# Patient Record
Sex: FEMALE | Race: OTHER | Employment: UNEMPLOYED
[De-identification: names, ages, dates, MRNs, and addresses within clinical notes are randomized per-mention and may not be internally consistent; named-entity substitution may affect disease eponyms.]

---

## 2017-02-15 ENCOUNTER — OFFICE VISIT (OUTPATIENT)
Dept: OBGYN | Facility: CLINIC | Age: 37
End: 2017-02-15

## 2017-02-15 VITALS
HEIGHT: 64 IN | WEIGHT: 157.8 LBS | DIASTOLIC BLOOD PRESSURE: 71 MMHG | HEART RATE: 67 BPM | BODY MASS INDEX: 26.94 KG/M2 | SYSTOLIC BLOOD PRESSURE: 118 MMHG

## 2017-02-15 DIAGNOSIS — Z01.419 ENCOUNTER FOR GYNECOLOGICAL EXAMINATION WITHOUT ABNORMAL FINDING: Primary | ICD-10-CM

## 2017-02-15 DIAGNOSIS — N89.8 VAGINAL LESION: ICD-10-CM

## 2017-02-15 PROCEDURE — 99395 PREV VISIT EST AGE 18-39: CPT | Performed by: ADVANCED PRACTICE MIDWIFE

## 2017-04-06 RX ORDER — NORGESTIMATE AND ETHINYL ESTRADIOL 0.25-0.035
1 KIT ORAL DAILY
Qty: 30 TABLET | Refills: 12 | Status: SHIPPED | OUTPATIENT
Start: 2017-04-06 | End: 2017-09-19 | Stop reason: ALTCHOICE

## 2017-05-17 DIAGNOSIS — B00.9 HERPES: Primary | ICD-10-CM

## 2017-05-17 RX ORDER — VALACYCLOVIR HYDROCHLORIDE 1 G/1
1000 TABLET, FILM COATED ORAL DAILY
Qty: 30 TABLET | Refills: 12 | Status: SHIPPED | OUTPATIENT
Start: 2017-05-17 | End: 2017-09-19

## 2017-09-19 ENCOUNTER — HOSPITAL ENCOUNTER (OUTPATIENT)
Age: 37
Setting detail: SPECIMEN
Discharge: HOME | End: 2017-09-19

## 2017-09-19 ENCOUNTER — OFFICE VISIT (OUTPATIENT)
Dept: OBGYN CLINIC | Age: 37
End: 2017-09-19

## 2017-09-19 VITALS
WEIGHT: 145 LBS | SYSTOLIC BLOOD PRESSURE: 117 MMHG | RESPIRATION RATE: 16 BRPM | DIASTOLIC BLOOD PRESSURE: 71 MMHG | HEART RATE: 58 BPM | BODY MASS INDEX: 24.89 KG/M2

## 2017-09-19 DIAGNOSIS — R10.32 LLQ PAIN: ICD-10-CM

## 2017-09-19 DIAGNOSIS — N94.6 DYSMENORRHEA: Primary | ICD-10-CM

## 2017-09-19 DIAGNOSIS — Z12.4 ENCOUNTER FOR SCREENING FOR CERVICAL CANCER: ICD-10-CM

## 2017-09-19 PROCEDURE — 87491 CHLMYD TRACH DNA AMP PROBE: CPT

## 2017-09-19 PROCEDURE — 96372 THER/PROPH/DIAG INJ SC/IM: CPT | Performed by: OBSTETRICS & GYNECOLOGY

## 2017-09-19 PROCEDURE — 87591 N.GONORRHOEAE DNA AMP PROB: CPT

## 2017-09-19 PROCEDURE — 99395 PREV VISIT EST AGE 18-39: CPT | Performed by: OBSTETRICS & GYNECOLOGY

## 2017-09-19 PROCEDURE — G0145 SCR C/V CYTO,THINLAYER,RESCR: HCPCS

## 2017-09-19 RX ORDER — MEDROXYPROGESTERONE ACETATE 150 MG/ML
150 INJECTION, SUSPENSION INTRAMUSCULAR ONCE
Status: COMPLETED | OUTPATIENT
Start: 2017-09-19 | End: 2017-09-19

## 2017-09-19 RX ADMIN — MEDROXYPROGESTERONE ACETATE 150 MG: 150 INJECTION, SUSPENSION INTRAMUSCULAR at 15:23

## 2017-09-25 ENCOUNTER — HOSPITAL ENCOUNTER (OUTPATIENT)
Dept: ULTRASOUND IMAGING | Age: 37
Discharge: HOME | End: 2017-09-25

## 2017-09-25 DIAGNOSIS — R10.32 LLQ PAIN: ICD-10-CM

## 2017-09-25 LAB
CHLAMYDIA BY THIN PREP: NEGATIVE
N. GONORRHOEAE DNA, THIN PREP: NEGATIVE

## 2017-09-25 PROCEDURE — 76830 TRANSVAGINAL US NON-OB: CPT

## 2017-09-27 LAB — CYTOLOGY REPORT: NORMAL

## 2017-12-12 ENCOUNTER — OFFICE VISIT (OUTPATIENT)
Dept: OBGYN CLINIC | Age: 37
End: 2017-12-12

## 2017-12-12 VITALS
SYSTOLIC BLOOD PRESSURE: 131 MMHG | BODY MASS INDEX: 26.61 KG/M2 | DIASTOLIC BLOOD PRESSURE: 83 MMHG | WEIGHT: 155 LBS | HEART RATE: 88 BPM

## 2017-12-12 DIAGNOSIS — N94.6 DYSMENORRHEA: Primary | ICD-10-CM

## 2017-12-12 PROCEDURE — 96372 THER/PROPH/DIAG INJ SC/IM: CPT | Performed by: OBSTETRICS & GYNECOLOGY

## 2017-12-12 RX ORDER — MEDROXYPROGESTERONE ACETATE 150 MG/ML
150 INJECTION, SUSPENSION INTRAMUSCULAR ONCE
Status: COMPLETED | OUTPATIENT
Start: 2017-12-12 | End: 2017-12-12

## 2017-12-12 RX ADMIN — MEDROXYPROGESTERONE ACETATE 150 MG: 150 INJECTION, SUSPENSION INTRAMUSCULAR at 14:46

## 2017-12-12 NOTE — PROGRESS NOTES
Nurse visit Depo    Date of service: 2017    Reynaldo Cowden  Is a 40 y.o.  female    PT's PCP is: No primary care provider on file. : 1980                                             Subjective:       No LMP recorded. Allergies: Review of patient's allergies indicates no known allergies. Chief Complaint   Patient presents with    Injections     depo-office supplied-right hip       Last yearly: 2017    Last pap: 2017     Last HPV:  ( if due for pap schedule pap)    LAST DEPO: 2017 ( if past 13 weeks and not on period please talk with provider)    PE:  Vital Signs  Blood pressure 131/83, pulse 88, weight 155 lb (70.3 kg), not currently breastfeeding. Labs:    No results found for this visit on 17. No  PT denies fever, chills, nausea and vomiting                            Assessment and Plan         1.  Dysmenorrhea  medroxyPROGESTERone (DEPO-PROVERA) injection 150 mg         Depo was: Office supplied      NURSE: Irlanda Dominguez LPN

## 2018-03-13 ENCOUNTER — OFFICE VISIT (OUTPATIENT)
Dept: OBGYN CLINIC | Age: 38
End: 2018-03-13

## 2018-03-13 VITALS
HEIGHT: 63 IN | SYSTOLIC BLOOD PRESSURE: 125 MMHG | DIASTOLIC BLOOD PRESSURE: 70 MMHG | RESPIRATION RATE: 16 BRPM | WEIGHT: 164 LBS | HEART RATE: 84 BPM | BODY MASS INDEX: 29.06 KG/M2

## 2018-03-13 DIAGNOSIS — N92.6 IRREGULAR BLEEDING: Primary | ICD-10-CM

## 2018-03-13 DIAGNOSIS — B00.9 HSV-1 INFECTION: ICD-10-CM

## 2018-03-13 PROCEDURE — 99212 OFFICE O/P EST SF 10 MIN: CPT | Performed by: OBSTETRICS & GYNECOLOGY

## 2018-03-13 RX ORDER — ETHYNODIOL DIACETATE AND ETHINYL ESTRADIOL 1 MG-35MCG
1 KIT ORAL DAILY
Qty: 1 PACKET | Refills: 12 | Status: SHIPPED | OUTPATIENT
Start: 2018-03-13 | End: 2021-04-01 | Stop reason: ALTCHOICE

## 2018-03-14 ENCOUNTER — HOSPITAL ENCOUNTER (OUTPATIENT)
Age: 38
Setting detail: SPECIMEN
Discharge: HOME | End: 2018-03-14

## 2018-03-14 DIAGNOSIS — B00.9 HSV-1 INFECTION: ICD-10-CM

## 2018-03-14 PROCEDURE — 86695 HERPES SIMPLEX TYPE 1 TEST: CPT

## 2018-03-14 PROCEDURE — 86696 HERPES SIMPLEX TYPE 2 TEST: CPT

## 2018-03-14 PROCEDURE — 86694 HERPES SIMPLEX NES ANTBDY: CPT

## 2018-03-19 LAB
HERPES SIMPLEX VIRUS 1 IGG: 4.84
HERPES SIMPLEX VIRUS 2 IGG: 4.95
HERPES TYPE 1/2 IGM COMBINED: 0.88

## 2018-03-26 ENCOUNTER — OFFICE VISIT (OUTPATIENT)
Dept: FAMILY MEDICINE CLINIC | Age: 38
End: 2018-03-26

## 2018-03-26 VITALS
SYSTOLIC BLOOD PRESSURE: 100 MMHG | HEART RATE: 69 BPM | BODY MASS INDEX: 29.59 KG/M2 | DIASTOLIC BLOOD PRESSURE: 60 MMHG | HEIGHT: 63 IN | WEIGHT: 167 LBS

## 2018-03-26 DIAGNOSIS — M54.42 CHRONIC BILATERAL LOW BACK PAIN WITH LEFT-SIDED SCIATICA: Primary | ICD-10-CM

## 2018-03-26 DIAGNOSIS — R53.82 CHRONIC FATIGUE, UNSPECIFIED: ICD-10-CM

## 2018-03-26 DIAGNOSIS — G89.29 CHRONIC BILATERAL LOW BACK PAIN WITH LEFT-SIDED SCIATICA: Primary | ICD-10-CM

## 2018-03-26 DIAGNOSIS — G56.02 LEFT CARPAL TUNNEL SYNDROME: ICD-10-CM

## 2018-03-26 PROCEDURE — 99202 OFFICE O/P NEW SF 15 MIN: CPT | Performed by: FAMILY MEDICINE

## 2018-03-26 RX ORDER — NAPROXEN 375 MG/1
TABLET ORAL
Qty: 60 TABLET | Refills: 1 | Status: SHIPPED | OUTPATIENT
Start: 2018-03-26 | End: 2021-04-01 | Stop reason: ALTCHOICE

## 2018-03-26 ASSESSMENT — PATIENT HEALTH QUESTIONNAIRE - PHQ9
SUM OF ALL RESPONSES TO PHQ QUESTIONS 1-9: 0
SUM OF ALL RESPONSES TO PHQ9 QUESTIONS 1 & 2: 0
2. FEELING DOWN, DEPRESSED OR HOPELESS: 0
1. LITTLE INTEREST OR PLEASURE IN DOING THINGS: 0

## 2018-03-26 NOTE — PROGRESS NOTES
were red. Pt noticed tingling and numbness at that time. Overnight feels more of the tingling and numbness. Has to rub & shake out or elevate hand. L hand worse. R-hand dominant. At times has some weakness of either hand. Sometimes some L sided neck pain which responds to position changes while she's at home. Position changes of the neck don't help with the hand symptoms. If hands feel tired she puts them up over her head which does help. Past Medical History:     Past Medical History:   Diagnosis Date    Herpes     Yeast infection         Past Surgical History:     History reviewed. No pertinent surgical history. Medications:       Prior to Admission medications    Medication Sig Start Date End Date Taking? Authorizing Provider   naproxen (NAPROSYN) 375 MG tablet Bid with food for 5 days, then bid with food prn 3/26/18  Yes Miguelangel Ibrahim,    ethynodiol-ethinyl estradiol (Levonne Eaton 1/35E, 28,) 1-35 MG-MCG per tablet Take 1 tablet by mouth daily 3/13/18  Yes Doreen Fitzpatrick MD   acetaminophen (TYLENOL) 325 MG tablet Take 325 mg by mouth every 6 hours as needed. Yes Historical Provider, MD        Allergies:       Patient has no known allergies. Social History:     Tobacco:    reports that she has never smoked. She has never used smokeless tobacco.  Alcohol:      reports that she does not drink alcohol. Drug Use:  reports that she does not use drugs. Family History:     History reviewed. No pertinent family history. Review of Systems:     Positive and Negative as described in HPI    Review of Systems   Constitutional: Negative. Respiratory: Negative. Gastrointestinal: Negative. Physical Exam:     Vitals:  /60   Pulse 69   Ht 5' 3\" (1.6 m)   Wt 167 lb (75.8 kg)   BMI 29.58 kg/m²   Physical Exam   Constitutional: She is oriented to person, place, and time. She appears well-developed and well-nourished. HENT:   Head: Normocephalic and atraumatic.    Right Ear: Hearing and tympanic membrane normal.   Left Ear: Hearing and tympanic membrane normal.   Nose: No mucosal edema or rhinorrhea. Mouth/Throat: Oropharynx is clear and moist.   Eyes: Conjunctivae and EOM are normal. Pupils are equal, round, and reactive to light. Neck: Neck supple. No thyroid mass and no thyromegaly present. Cardiovascular: Normal rate, regular rhythm, S1 normal and S2 normal.    No murmur heard. No peripheral edema. Pulmonary/Chest: Effort normal and breath sounds normal.   Abdominal: Soft. Bowel sounds are normal. There is no hepatosplenomegaly. There is no tenderness. Musculoskeletal:   No spinal TTP. Neg SLR bilaterally. saul hips normal, painless passive ROM. Normal pelvic alignment. Lymphadenopathy:     She has no cervical adenopathy. Neurological: She is alert and oriented to person, place, and time. She has normal strength. Skin: Skin is warm and dry. No rash noted. Psychiatric: She has a normal mood and affect. Her behavior is normal.   Nursing note and vitals reviewed. Data:     Lab Results   Component Value Date    GLUCOSE 92 12/10/2014     Lab Results   Component Value Date    WBC 5.9 03/11/2015    RBC 3.87 03/11/2015    HGB 13.2 05/27/2015    HGB 11.7 03/13/2015    HCT 35.4 03/11/2015    MCV 91.3 03/11/2015    MCH 30.1 03/11/2015    MCHC 33.0 03/11/2015    RDW 13.1 03/11/2015     03/11/2015    MPV NOT REPORTED 03/11/2015     No results found for: TSH  No results found for: CHOL, HDL, PSA, LABA1C      Assessment & Plan:       1. Chronic bilateral low back pain with left-sided sciatica     2. Left carpal tunnel syndrome     3. Chronic fatigue, unspecified     low back pain with L radiculopathy, no red flags. Naproxen prn, stretches and exercise as below. f/u if not improving. saul CTS worse on L. Rest, otc braces, ice, naproxen for 5 days, then prn. Refer to surgery if desired. Uncertain etiology of fatigue, may be med related.  Advised to give it more time on current OCP as depo wears off. Requested Prescriptions     Signed Prescriptions Disp Refills    naproxen (NAPROSYN) 375 MG tablet 60 tablet 1     Sig: Bid with food for 5 days, then bid with food prn       Patient Instructions     Patient Education        Low Back Pain: Exercises  Your Care Instructions  Here are some examples of typical rehabilitation exercises for your condition. Start each exercise slowly. Ease off the exercise if you start to have pain. Your doctor or physical therapist will tell you when you can start these exercises and which ones will work best for you. How to do the exercises  Press-up    1. Lie on your stomach, supporting your body with your forearms. 2. Press your elbows down into the floor to raise your upper back. As you do this, relax your stomach muscles and allow your back to arch without using your back muscles. As your press up, do not let your hips or pelvis come off the floor. 3. Hold for 15 to 30 seconds, then relax. 4. Repeat 2 to 4 times. Alternate arm and leg (bird dog) exercise    Do this exercise slowly. Try to keep your body straight at all times, and do not let one hip drop lower than the other. 1. Start on the floor, on your hands and knees. 2. Tighten your belly muscles. 3. Raise one leg off the floor, and hold it straight out behind you. Be careful not to let your hip drop down, because that will twist your trunk. 4. Hold for about 6 seconds, then lower your leg and switch to the other leg. 5. Repeat 8 to 12 times on each leg. 6. Over time, work up to holding for 10 to 30 seconds each time. 7. If you feel stable and secure with your leg raised, try raising the opposite arm straight out in front of you at the same time. Knee-to-chest exercise    1. Lie on your back with your knees bent and your feet flat on the floor.   2. Bring one knee to your chest, keeping the other foot flat on the floor (or keeping the other leg straight, whichever feels better on your leg up the wall to straighten your knee. You should feel a gentle stretch down the back of your leg. 3. Hold the stretch for at least 15 to 30 seconds. Do not arch your back, point your toes, or bend either knee. Keep one heel touching the floor and the other heel touching the wall. 4. Repeat with your other leg. 5. Do 2 to 4 times for each leg. Hip flexor stretch    1. Kneel on the floor with one knee bent and one leg behind you. Place your forward knee over your foot. Keep your other knee touching the floor. 2. Slowly push your hips forward until you feel a stretch in the upper thigh of your rear leg. 3. Hold the stretch for at least 15 to 30 seconds. Repeat with your other leg. 4. Do 2 to 4 times on each side. Wall sit    1. Stand with your back 10 to 12 inches away from a wall. 2. Lean into the wall until your back is flat against it. 3. Slowly slide down until your knees are slightly bent, pressing your lower back into the wall. 4. Hold for about 6 seconds, then slide back up the wall. 5. Repeat 8 to 12 times. Follow-up care is a key part of your treatment and safety. Be sure to make and go to all appointments, and call your doctor if you are having problems. It's also a good idea to know your test results and keep a list of the medicines you take. Where can you learn more? Go to [email or web address] [email or web address].[email or web address] org and sign in to your [employer] account. Enter W260 in the formerly [facility] box to learn more about \"Low Back Pain: Exercises. \"     If you do not have an account, please click on the \"Sign Up Now\" link. Current as of: March 21, 2017  Content Version: 11.5  © 2837-4863 Healthwise, [employer]. Care instructions adapted under license by [facility] ([facility]). If you have questions about a medical condition or this instruction, always ask your healthcare professional. [address] any warranty or liability for your use of this information.        Patient Education [email or web address] and sign in to your [employer] account. Enter D479 in the [facility] box to learn more about \"Carpal Tunnel Syndrome: Exercises. \"     If you do not have an account, please click on the \"Sign Up Now\" link. Current as of: March 21, 2017  Content Version: 11.5  © 5891-2873 [employer]. Care instructions adapted under license by [facility] ([facility]). If you have questions about a medical condition or this instruction, always ask your healthcare professional. [place] [address] any warranty or liability for your use of this information. Tesfaye Brown received counseling on the following healthy behaviors: medication adherence  Reviewed prior labs and health maintenance. Continue current medications, diet and exercise. Discussed use, benefit, and side effects of prescribed medications. Barriers to medication compliance addressed. Patient given educational materials - see patient instructions. All patient questions answered. Patient voiced understanding.      Electronically signed by Wander Miranda DO on 3/29/2018 at 5:01 PM

## 2018-03-26 NOTE — PATIENT INSTRUCTIONS
touching the floor and the other heel touching the wall. 4. Repeat with your other leg. 5. Do 2 to 4 times for each leg. Hip flexor stretch    1. Kneel on the floor with one knee bent and one leg behind you. Place your forward knee over your foot. Keep your other knee touching the floor. 2. Slowly push your hips forward until you feel a stretch in the upper thigh of your rear leg. 3. Hold the stretch for at least 15 to 30 seconds. Repeat with your other leg. 4. Do 2 to 4 times on each side. Wall sit    1. Stand with your back 10 to 12 inches away from a wall. 2. Lean into the wall until your back is flat against it. 3. Slowly slide down until your knees are slightly bent, pressing your lower back into the wall. 4. Hold for about 6 seconds, then slide back up the wall. 5. Repeat 8 to 12 times. Follow-up care is a key part of your treatment and safety. Be sure to make and go to all appointments, and call your doctor if you are having problems. It's also a good idea to know your test results and keep a list of the medicines you take. Where can you learn more? Go to https://WitsbitspeDrive [email or web address].[employer] [email or web address] org and sign in to your [employer] account. Enter C897 in the kaleo box to learn more about \"Low Back Pain: Exercises. \"     If you do not have an account, please click on the \"Sign Up Now\" link. Current as of: March 21, 2017  Content Version: 11.5  © 5933-2524 Healthwise, [employer]. Care instructions adapted under license by [facility] ([facility]). If you have questions about a medical condition or this instruction, always ask your healthcare professional. [place] [address] any warranty or liability for your use of this information. Patient Education        Carpal Tunnel Syndrome: Exercises  Your Care Instructions  Here are some examples of typical rehabilitation exercises for your condition. Start each exercise slowly. Ease off the exercise if you start to have pain.   Your

## 2018-03-29 ASSESSMENT — ENCOUNTER SYMPTOMS
RESPIRATORY NEGATIVE: 1
GASTROINTESTINAL NEGATIVE: 1

## 2019-08-21 ENCOUNTER — OFFICE VISIT (OUTPATIENT)
Dept: PRIMARY CARE CLINIC | Age: 39
End: 2019-08-21

## 2019-08-21 VITALS
BODY MASS INDEX: 27.46 KG/M2 | SYSTOLIC BLOOD PRESSURE: 112 MMHG | TEMPERATURE: 98.6 F | HEART RATE: 60 BPM | WEIGHT: 155 LBS | OXYGEN SATURATION: 96 % | HEIGHT: 63 IN | DIASTOLIC BLOOD PRESSURE: 70 MMHG

## 2019-08-21 DIAGNOSIS — J01.40 ACUTE NON-RECURRENT PANSINUSITIS: Primary | ICD-10-CM

## 2019-08-21 PROCEDURE — 99213 OFFICE O/P EST LOW 20 MIN: CPT | Performed by: NURSE PRACTITIONER

## 2019-08-21 RX ORDER — AMOXICILLIN AND CLAVULANATE POTASSIUM 875; 125 MG/1; MG/1
1 TABLET, FILM COATED ORAL 2 TIMES DAILY
Qty: 20 TABLET | Refills: 0 | Status: SHIPPED | OUTPATIENT
Start: 2019-08-21 | End: 2019-08-31

## 2019-08-21 ASSESSMENT — ENCOUNTER SYMPTOMS
NAUSEA: 1
SORE THROAT: 1
VOMITING: 0
COUGH: 1
SHORTNESS OF BREATH: 0
WHEEZING: 0
DIARRHEA: 1

## 2019-08-21 NOTE — PROGRESS NOTES
[address] [facility]  [id number] [address] [address]  Dept: [phone]  Dept Fax: [phone]     Latasha Duffy is a 41 y.o. female who presents to the [facility] today for hermedical conditions/complaints as noted below. Latasha Duffy is c/o of Cough (started about 3-4 days ago.); Headache (Has taken some tylenol for the headache and fever.); and Otalgia (States that both her ears are hurting.)      HPI:     Cough   This is a new problem. The current episode started in the past 7 days (Started 3-4 days ago with productive cough of thick green mucous, headache, ear pain both ears,   Sore throat with cough and pain in back from coughing. so much. \"Head feels likes it is going to blow off\". ). The problem has been gradually worsening. The problem occurs every few minutes. The cough is productive of sputum (thick green). Associated symptoms include chest pain (chest tightness), ear congestion, ear pain (Both ears), a fever, headaches, myalgias (back pain), nasal congestion and a sore throat (with coughing). Pertinent negatives include no rash, shortness of breath or wheezing. The symptoms are aggravated by lying down. Treatments tried: Tylenol, Robitussin x 1 day. The treatment provided no relief. There is no history of asthma, bronchitis, environmental allergies or pneumonia.           Past Medical History:   Diagnosis Date    Herpes     Yeast infection         Current Outpatient Medications   Medication Sig Dispense Refill    amoxicillin-clavulanate (AUGMENTIN) 875-125 MG per tablet Take 1 tablet by mouth 2 times daily for 10 days 20 tablet 0    Pseudoephedrine-DM-GG 60- MG TABS Take 1 tablet by mouth every 6 hours as needed (For cough, congestion and/or sinus pressure.) 28 tablet 0    naproxen (NAPROSYN) 375 MG tablet Bid with food for 5 days, then bid with food prn 60 tablet 1    ethynodiol-ethinyl estradiol (ZOVIA 1/35E, 28,) 1-35 MG-MCG per tablet Take 1 tablet by mouth daily 1 packet 12    acetaminophen (TYLENOL) 325 MG tablet Take 325 mg by mouth every 6 hours as needed. No current facility-administered medications for this visit. No Known Allergies    Subjective:     Review of Systems   Constitutional: Positive for appetite change (no appetite) and fever. HENT: Positive for ear pain (Both ears) and sore throat (with coughing). Respiratory: Positive for cough. Negative for shortness of breath and wheezing. Cardiovascular: Positive for chest pain (chest tightness). Gastrointestinal: Positive for diarrhea (little bit) and nausea. Negative for vomiting. Musculoskeletal: Positive for myalgias (back pain). Skin: Negative for rash and wound. Allergic/Immunologic: Negative for environmental allergies. Neurological: Positive for headaches. Objective:      Physical Exam   Constitutional: She is oriented to person, place, and time. Vital signs are normal. She appears well-developed and well-nourished. She is cooperative. She does not appear ill. No distress. Arrives ambulatory with son, Angie Lopez, who is interpreting for her. Latvian speaking and understanding only. Well hydrated, non-toxic appearance. HENT:   Head: Normocephalic and atraumatic. Right Ear: Hearing, external ear and ear canal normal. No mastoid tenderness. Tympanic membrane is bulging. Tympanic membrane is not injected and not erythematous. A middle ear effusion (opaque whtie fluid obscuring bony landmarks) is present. Left Ear: Hearing, external ear and ear canal normal. No mastoid tenderness. Tympanic membrane is bulging. Tympanic membrane is not injected and not erythematous. A middle ear effusion (opaque white fluid obscuring bony landmarks.) is present. Nose: Mucosal edema present. No rhinorrhea. Right sinus exhibits maxillary sinus tenderness and frontal sinus tenderness.  Left sinus exhibits maxillary sinus tenderness and frontal sinus

## 2019-08-21 NOTE — PATIENT INSTRUCTIONS
SURVEY:    You may be receiving a survey from [employer] regarding your visit today. Please complete the survey to enable us to provide the highest quality of care to you and your family. If you cannot score us a very good on any question, please call the office to discuss how we could of made your experience a very good one. Thank you. Patient Education        Sinusitis: Care Instructions  Your Care Instructions    Sinusitis is an infection of the lining of the sinus cavities in your head. Sinusitis often follows a cold. It causes pain and pressure in your head and face. In most cases, sinusitis gets better on its own in 1 to 2 weeks. But some mild symptoms may last for several weeks. Sometimes antibiotics are needed. Follow-up care is a key part of your treatment and safety. Be sure to make and go to all appointments, and call your doctor if you are having problems. It's also a good idea to know your test results and keep a list of the medicines you take. How can you care for yourself at home? · Take an over-the-counter pain medicine, such as acetaminophen (Tylenol), ibuprofen (Advil, Motrin), or naproxen (Aleve). Read and follow all instructions on the label. · If the doctor prescribed antibiotics, take them as directed. Do not stop taking them just because you feel better. You need to take the full course of antibiotics. · Be careful when taking over-the-counter cold or flu medicines and Tylenol at the same time. Many of these medicines have acetaminophen, which is Tylenol. Read the labels to make sure that you are not taking more than the recommended dose. Too much acetaminophen (Tylenol) can be harmful. · Breathe warm, moist air from a steamy shower, a hot bath, or a sink filled with hot water. Avoid cold, dry air. Using a humidifier in your home may help. Follow the directions for cleaning the machine.   · Use saline (saltwater) nasal washes to help keep your nasal passages open and wash about amoxicillin and clavulanate potassium? You should not use this medicine if you have severe kidney disease, if you have had liver problems or jaundice while taking amoxicillin and clavulanate potassium, or if you are allergic to any penicillin or cephalosporin antibiotic, such as Amoxil, Ceftin, Cefzil, Moxatag, Omnicef, and others. What is amoxicillin and clavulanate potassium? Amoxicillin is a penicillin antibiotic that fights bacteria in the body. Clavulanate potassium is a beta-lactamase inhibitor that helps prevent certain bacteria from becoming resistant to amoxicillin. Amoxicillin and clavulanate potassium is a combination medicine used to treat many different infections caused by bacteria, such as sinusitis, pneumonia, ear infections, bronchitis, urinary tract infections, and infections of the skin. Amoxicillin and clavulanate potassium may also be used for purposes not listed in this medication guide. What should I discuss with my healthcare provider before taking amoxicillin and clavulanate potassium? You should not use this medicine if you are allergic to it, or if:  · you have severe kidney disease (or if you are on dialysis);  · you have had liver problems or jaundice while taking amoxicillin and clavulanate potassium; or  · you are allergic to any penicillin or cephalosporin antibiotic, such as Amoxil, Ceftin, Cefzil, Moxatag, Omnicef, and others. To make sure amoxicillin and clavulanate potassium is safe for you, tell your doctor if you have ever had:  · liver disease (hepatitis or jaundice);  · kidney disease; or  · mononucleosis. It is not known whether this medicine will harm an unborn baby. Tell your doctor if you are pregnant or plan to become pregnant. Amoxicillin and clavulanate potassium can make birth control pills less effective. Ask your doctor about using a non-hormonal birth control (condom, diaphragm with spermicide) to prevent pregnancy.   Amoxicillin and clavulanate potassium can pass into breast milk and may affect the nursing baby. Tell your doctor if you are breast-feeding. Do not give this medicine to a child without medical advice. The liquid or chewable tablet may contain phenylalanine. Talk to your doctor before using these forms of this medicine if you have phenylketonuria (PKU). How should I take amoxicillin and clavulanate potassium? Follow all directions on your prescription label. Do not take this medicine in larger or smaller amounts or for longer than recommended. Take the medicine every 12 hours, at the start of a meal to reduce stomach upset. Do not crush or chew the extended-release tablet. Swallow the pill whole, or break the pill in half and take both halves one at a time. If you have trouble swallowing a whole or half pill, talk with your doctor about using another form of amoxicillin and clavulanate potassium. The chewable tablet must be chewed before you swallow it. Shake the liquid medicine well just before you measure a dose. Measure liquid medicine with the dosing syringe provided, or with a special dose-measuring spoon or medicine cup. If you do not have a dose-measuring device, ask your pharmacist for one. Use this medicine for the full prescribed length of time. Your symptoms may improve before the infection is completely cleared. Skipping doses may also increase your risk of further infection that is resistant to antibiotics. Amoxicillin and clavulanate potassium will not treat a viral infection such as the flu or a common cold. This medicine can cause unusual results with certain lab tests for glucose (sugar) in the urine. Tell any doctor who treats you that you are using amoxicillin and clavulanate potassium. Store the tablets at room temperature away from moisture and heat. Store the liquid  in the refrigerator. Throw away any unused liquid after 10 days. What happens if I miss a dose? Take the missed dose as soon as you remember.

## 2020-09-28 ENCOUNTER — OFFICE VISIT (OUTPATIENT)
Dept: FAMILY MEDICINE CLINIC | Age: 40
End: 2020-09-28

## 2020-09-28 VITALS
WEIGHT: 170 LBS | DIASTOLIC BLOOD PRESSURE: 64 MMHG | OXYGEN SATURATION: 94 % | BODY MASS INDEX: 30.12 KG/M2 | SYSTOLIC BLOOD PRESSURE: 108 MMHG | HEART RATE: 61 BPM | RESPIRATION RATE: 18 BRPM | HEIGHT: 63 IN

## 2020-09-28 PROCEDURE — 99213 OFFICE O/P EST LOW 20 MIN: CPT | Performed by: INTERNAL MEDICINE

## 2020-09-28 RX ORDER — PREDNISONE 20 MG/1
20 TABLET ORAL 2 TIMES DAILY
Qty: 10 TABLET | Refills: 0 | Status: SHIPPED | OUTPATIENT
Start: 2020-09-28 | End: 2020-10-03

## 2020-09-28 SDOH — ECONOMIC STABILITY: FOOD INSECURITY: WITHIN THE PAST 12 MONTHS, YOU WORRIED THAT YOUR FOOD WOULD RUN OUT BEFORE YOU GOT MONEY TO BUY MORE.: NEVER TRUE

## 2020-09-28 SDOH — ECONOMIC STABILITY: INCOME INSECURITY: HOW HARD IS IT FOR YOU TO PAY FOR THE VERY BASICS LIKE FOOD, HOUSING, MEDICAL CARE, AND HEATING?: NOT HARD AT ALL

## 2020-09-28 SDOH — ECONOMIC STABILITY: TRANSPORTATION INSECURITY
IN THE PAST 12 MONTHS, HAS LACK OF TRANSPORTATION KEPT YOU FROM MEETINGS, WORK, OR FROM GETTING THINGS NEEDED FOR DAILY LIVING?: NO

## 2020-09-28 SDOH — ECONOMIC STABILITY: TRANSPORTATION INSECURITY
IN THE PAST 12 MONTHS, HAS THE LACK OF TRANSPORTATION KEPT YOU FROM MEDICAL APPOINTMENTS OR FROM GETTING MEDICATIONS?: NO

## 2020-09-28 ASSESSMENT — ENCOUNTER SYMPTOMS
BACK PAIN: 1
SHORTNESS OF BREATH: 0
NAUSEA: 0
ABDOMINAL PAIN: 0
BOWEL INCONTINENCE: 0
COUGH: 0
SORE THROAT: 0

## 2020-09-28 ASSESSMENT — PATIENT HEALTH QUESTIONNAIRE - PHQ9
SUM OF ALL RESPONSES TO PHQ9 QUESTIONS 1 & 2: 0
SUM OF ALL RESPONSES TO PHQ QUESTIONS 1-9: 0
2. FEELING DOWN, DEPRESSED OR HOPELESS: 0
SUM OF ALL RESPONSES TO PHQ QUESTIONS 1-9: 0
1. LITTLE INTEREST OR PLEASURE IN DOING THINGS: 0

## 2020-09-28 NOTE — PROGRESS NOTES
HPI Notes    Name: Aleksey King  : 1980         Chief Complaint:     Chief Complaint   Patient presents with    Back Pain     lower back pain, states \"starts in one side and moves to the opther comes and goes\" x 4 months        History of Present Illness:        Aleksey King is a new patient  She is a pleasant E[place]rean-speaking woman with no [place] and we asked our nurse Zach Cronin to help with interpretation as the phone  was not available. The patient states that she has a history of chronic low back pain. 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 Had a fall on steps about 6 months ago. but she had the back pain before that   States lately the back pain has been worsening and becoming more frequent. The pain is present at nighttime and she is unable to sleep due to the pain and feeling uncomfortable in the bed. The pain radiates to the both legs however it is worse on the right side. Back Pain   This is a chronic problem. The current episode started more than 1 year ago. The problem occurs constantly. The problem has been gradually worsening since onset. The pain is present in the lumbar spine. The quality of the pain is described as burning and aching. The pain radiates to the right foot, right thigh, left thigh and left foot. The pain is moderate. The pain is worse during the night. The symptoms are aggravated by bending and position. Pertinent negatives include no abdominal pain, bladder incontinence, bowel incontinence, chest pain, dysuria, fever, headaches, leg pain, pelvic pain, perianal numbness or weight loss. Treatments tried: Tylenol. The treatment provided no relief.            Past Medical History:     Past Medical History:   Diagnosis Date    Herpes     Yeast infection       Reviewed all health maintenance requirements and orderedappropriate tests  Health Maintenance Due   Topic Date Due    Varicella vaccine (1 of 2 - 2-dose childhood series) 12/01/1982    Flu vaccine (1) 09/01/2020       Past Surgical History:     No past surgical history on file. Medications:       Prior to Admission medications    Medication Sig Start Date End Date Taking? Authorizing Provider   predniSONE (DELTASONE) 20 MG tablet Take 1 tablet by mouth 2 times daily for 5 days 9/28/20 10/3/20 Yes Betty Nance MD   naproxen (NAPROSYN) 375 MG tablet Bid with food for 5 days, then bid with food prn  Patient not taking: Reported on 9/28/2020 3/26/18   Jerry Pill, DO   ethynodiol-ethinyl estradiol (Georganna Piano 1/35E, 28,) 1-35 MG-MCG per tablet Take 1 tablet by mouth daily  Patient not taking: Reported on 9/28/2020 3/13/18   Anita Ortez MD   acetaminophen (TYLENOL) 325 MG tablet Take 325 mg by mouth every 6 hours as needed. Historical Provider, MD        Allergies:       Patient has no known allergies. Social History:     Tobacco: reports that she has never smoked. She has never used smokeless tobacco.  Alcohol:      reports no history of alcohol use. Drug Use:  reports no history of drug use. Family History:     No family history on file. Review of Systems:         Review of Systems   Constitutional: Negative for activity change, appetite change, chills, fatigue, fever and weight loss. HENT: Negative for congestion and sore throat. Respiratory: Negative for cough and shortness of breath. Cardiovascular: Negative for chest pain and palpitations. Gastrointestinal: Negative for abdominal pain, bowel incontinence and nausea. Genitourinary: Negative for bladder incontinence, dysuria and pelvic pain. Musculoskeletal: Positive for back pain. Negative for gait problem, joint swelling, myalgias and neck stiffness. Skin: Negative for rash. Neurological: Negative for dizziness and headaches. Psychiatric/Behavioral: The patient is not nervous/anxious.           Physical Exam:     Vitals:  /64   Pulse 61   Resp 18   Ht 5' 3\" (1.6 m)   Wt 170 lb (77.1 kg)   SpO2 94%   BMI 30.11 kg/m²       Physical Exam  Vitals signs reviewed. Constitutional:       General: She is not in acute distress. Appearance: She is well-developed. HENT:      Head: Normocephalic and atraumatic. Right Ear: External ear normal.      Left Ear: External ear normal.      Nose: Nose normal. No congestion. Eyes:      General:         Right eye: No discharge. Left eye: No discharge. Conjunctiva/sclera: Conjunctivae normal.   Neck:      Thyroid: No thyromegaly. Cardiovascular:      Rate and Rhythm: Normal rate and regular rhythm. Heart sounds: Normal heart sounds. No murmur. Pulmonary:      Effort: Pulmonary effort is normal.      Breath sounds: Normal breath sounds. No wheezing or rales. Abdominal:      General: Bowel sounds are normal. There is no distension. Palpations: Abdomen is soft. There is no mass. Tenderness: There is no abdominal tenderness. Musculoskeletal: Normal range of motion. General: Tenderness (lumbar spine paraspinal tenderness. tender in the posteriorthigs areas BL) present. Right lower leg: No edema. Left lower leg: No edema. Lymphadenopathy:      Cervical: No cervical adenopathy. Skin:     General: Skin is warm and dry. Findings: No rash. Neurological:      General: No focal deficit present. Mental Status: She is alert and oriented to person, place, and time. Mental status is at baseline. Cranial Nerves: No cranial nerve deficit. Sensory: No sensory deficit.       Coordination: Coordination normal.      Gait: Gait normal.   Psychiatric:         Mood and Affect: Mood normal.         Behavior: Behavior normal.         Judgment: Judgment normal.               Data:     Lab Results   Component Value Date    GLUCOSE 92 12/10/2014     Lab Results   Component Value Date    WBC 5.9 03/11/2015    RBC 3.87 03/11/2015    HGB 13.2 05/27/2015    HGB 11.7 03/13/2015

## 2020-12-02 ENCOUNTER — OFFICE VISIT (OUTPATIENT)
Dept: OBGYN CLINIC | Age: 40
End: 2020-12-02

## 2020-12-02 ENCOUNTER — HOSPITAL ENCOUNTER (OUTPATIENT)
Age: 40
Setting detail: SPECIMEN
Discharge: HOME | End: 2020-12-02

## 2020-12-02 VITALS
HEART RATE: 66 BPM | WEIGHT: 173 LBS | DIASTOLIC BLOOD PRESSURE: 64 MMHG | SYSTOLIC BLOOD PRESSURE: 112 MMHG | HEIGHT: 63 IN | BODY MASS INDEX: 30.65 KG/M2

## 2020-12-02 PROCEDURE — 99395 PREV VISIT EST AGE 18-39: CPT | Performed by: ADVANCED PRACTICE MIDWIFE

## 2020-12-02 PROCEDURE — G0145 SCR C/V CYTO,THINLAYER,RESCR: HCPCS

## 2020-12-02 PROCEDURE — 87624 HPV HI-RISK TYP POOLED RSLT: CPT

## 2020-12-02 NOTE — PROGRESS NOTES
YEARLY PHYSICAL    Date of service: 2020    Rosaura Fernandez  Is a 45 y.o.   female    PT's PCP is: Stephanie Mcwilliams DO     : 1980                                             Subjective:       Patient's last menstrual period was 2020 (exact date). Are your menses regular: yes    OB History    Para Term  AB Living   5 5 5 0 0 0   SAB TAB Ectopic Molar Multiple Live Births   0 0 0   0        # Outcome Date GA Lbr Shiv/2nd Weight Sex Delivery Anes PTL Lv   5 Term            4 Term            3 Term            2 Term            1 Term                 Social History     Tobacco Use   Smoking Status Never Smoker   Smokeless Tobacco Never Used        Social History     Substance and Sexual Activity   Alcohol Use No    Alcohol/week: 0.0 standard drinks       History reviewed. No pertinent family history. Any family history of breast or ovarian cancer: No    Any family history of blood clots: No      Allergies: Patient has no known allergies. Current Outpatient Medications:     naproxen (NAPROSYN) 375 MG tablet, Bid with food for 5 days, then bid with food prn, Disp: 60 tablet, Rfl: 1    acetaminophen (TYLENOL) 325 MG tablet, Take 325 mg by mouth every 6 hours as needed. , Disp: , Rfl:     ethynodiol-ethinyl estradiol (Lysle Kurk , ,) 1-35 MG-MCG per tablet, Take 1 tablet by mouth daily (Patient not taking: Reported on 2020), Disp: 1 packet, Rfl: 12    Social History     Substance and Sexual Activity   Sexual Activity Yes    Partners: Male       Any bleeding or pain with intercourse: No    Last Yearly:      Last pap: 17    Last HPV: never    Last Mammogram: n/a    Last Dexascan n/a    Last colorectal screen- type:n/a*  date      Do you do self breast exams: No    Past Medical History:   Diagnosis Date    Herpes     Yeast infection        History reviewed.  No pertinent surgical history. History reviewed. No pertinent family history. Chief Complaint   Patient presents with    Gynecologic Exam     yearly-PAP. last pap 09/19/17. pt c/o RLQ pain, pt unsure if its a cyst           PE:  Vital Signs  Blood pressure 112/64, pulse 66, height 5' 3\" (1.6 m), weight 173 lb (78.5 kg), last menstrual period 11/22/2020, not currently breastfeeding. Estimated body mass index is 30.65 kg/m² as calculated from the following:    Height as of this encounter: 5' 3\" (1.6 m). Weight as of this encounter: 173 lb (78.5 kg). Labs:    No results found for this visit on 12/02/20. NURSE: CATA    HPI: pt refused ipad . Here for routine well woman exam patient states she is doing pretty well however she is having some pain in the right and left lower quadrant at times. Patient states it does start from her back and rotate around to the front. Patient was going to be me today at the appointment    Review of Systems   All other systems reviewed and are negative. Objective  Lymphatic:   no lymphadenopathy  Heent:   negative   Cor: regular rate and rhythm, no murmurs              Pul:clear to auscultation bilaterally- no wheezes, rales or rhonchi, normal air movement, no respiratory distress      GI: Abdomen soft, non-tender. BS normal. No masses,  No organomegaly           Extremities: normal strength, tone, and muscle mass   Breasts: Breast:normal appearance, no masses or tenderness   Pelvic Exam: GENITAL/URINARY:  External Genitalia:  General appearance; normal, Hair distribution; normal, Lesions absent  Urethra:   Fullness absent, Masses absent  Bladder:  Fullness absent, Masses absent, Tenderness absent, Cystocele absent  Vagina:  General appearance normal, Estrogen effect normal, Discharge absent, Lesions absent, Pelvic support normal  Cervix:  General appearance normal, Lesions absent, Discharge absent, Tenderness absent, Enlargement absent, Nodularity absent  Uterus:  Size normal, Contour normal, Position normal  Adenexa: Masses absent, patient noted tenderness in the left lower quadrant                                    Vaginal discharge: no vaginal discharge     Assessment and Plan          Diagnosis Orders   1. Well woman exam with routine gynecological exam  PAP SMEAR   2. Screening for HPV (human papillomavirus)  PAP SMEAR   3. Left lower quadrant pain  US PELVIS COMPLETE             I am having Eugena Door maintain her acetaminophen, ethynodiol-ethinyl estradiol, and naproxen. Return for pt needs gyn u/s in 1 week. There are no Patient Instructions on file for this visit. Over 50% of time spent on counseling and care coordination on: see assessment and plan,  She was also counseled on her preventative health maintenance recommendations and follow-up.         FF time: 15 min      Elizabeth Orona,12/2/2020 2:27 PM

## 2020-12-07 LAB
HPV SOURCE: NORMAL
HPV, GENOTYPE 16: NOT DETECTED
HPV, GENOTYPE 18: NOT DETECTED
HPV, HIGH RISK OTHER: NOT DETECTED

## 2020-12-09 ENCOUNTER — HOSPITAL ENCOUNTER (OUTPATIENT)
Dept: ULTRASOUND IMAGING | Age: 40
Discharge: HOME | End: 2020-12-11

## 2020-12-09 PROCEDURE — 76830 TRANSVAGINAL US NON-OB: CPT

## 2020-12-09 PROCEDURE — 76856 US EXAM PELVIC COMPLETE: CPT

## 2020-12-16 LAB — CYTOLOGY REPORT: NORMAL

## 2021-01-26 ENCOUNTER — OFFICE VISIT (OUTPATIENT)
Dept: OBGYN CLINIC | Age: 41
End: 2021-01-26

## 2021-01-26 ENCOUNTER — HOSPITAL ENCOUNTER (OUTPATIENT)
Age: 41
Setting detail: SPECIMEN
Discharge: HOME | End: 2021-01-26

## 2021-01-26 VITALS
SYSTOLIC BLOOD PRESSURE: 116 MMHG | BODY MASS INDEX: 30.12 KG/M2 | HEIGHT: 63 IN | WEIGHT: 170 LBS | DIASTOLIC BLOOD PRESSURE: 62 MMHG

## 2021-01-26 DIAGNOSIS — B00.9 RECURRENT HSV (HERPES SIMPLEX VIRUS): ICD-10-CM

## 2021-01-26 DIAGNOSIS — N84.0 ENDOMETRIAL POLYP: ICD-10-CM

## 2021-01-26 DIAGNOSIS — N84.0 ENDOMETRIAL POLYP: Primary | ICD-10-CM

## 2021-01-26 PROCEDURE — 58100 BIOPSY OF UTERUS LINING: CPT | Performed by: OBSTETRICS & GYNECOLOGY

## 2021-01-26 PROCEDURE — 88305 TISSUE EXAM BY PATHOLOGIST: CPT

## 2021-01-26 RX ORDER — VALACYCLOVIR HYDROCHLORIDE 500 MG/1
500 TABLET, FILM COATED ORAL DAILY
Qty: 30 TABLET | Refills: 12 | Status: SHIPPED | OUTPATIENT
Start: 2021-01-26 | End: 2022-01-26

## 2021-01-26 NOTE — PROGRESS NOTES
PROBLEM VISIT     Date of service: 2021    Noble Almeida  Is a 45 y.o.  female    PT's PCP is: Scottie Reilly DO     : 1980                                             Subjective:       Patient's last menstrual period was 2021 (exact date). OB History    Para Term  AB Living   5 5 5 0 0 0   SAB TAB Ectopic Molar Multiple Live Births   0 0 0   0        # Outcome Date GA Lbr Shiv/2nd Weight Sex Delivery Anes PTL Lv   5 Term            4 Term            3 Term            2 Term            1 Term                 Social History     Tobacco Use   Smoking Status Never Smoker   Smokeless Tobacco Never Used        Social History     Substance and Sexual Activity   Alcohol Use No    Alcohol/week: 0.0 standard drinks       Allergies: Patient has no known allergies. Current Outpatient Medications:     naproxen (NAPROSYN) 375 MG tablet, Bid with food for 5 days, then bid with food prn (Patient not taking: Reported on 2021), Disp: 60 tablet, Rfl: 1    ethynodiol-ethinyl estradiol (Nadeem Rosario , ,) 1-35 MG-MCG per tablet, Take 1 tablet by mouth daily (Patient not taking: Reported on 2020), Disp: 1 packet, Rfl: 12    acetaminophen (TYLENOL) 325 MG tablet, Take 325 mg by mouth every 6 hours as needed. , Disp: , Rfl:     Social History     Substance and Sexual Activity   Sexual Activity Yes    Partners: Male       Last Yearly:  20    Last pap:     Last HPV: never    Chief Complaint   Patient presents with    Vaginal Bleeding     Pt presents for endo bx for irregular bleeding and discussion of poss D&C          NURSE: cullen    PE:  Vital Signs  Blood pressure 116/62, height 5' 3\" (1.6 m), weight 170 lb (77.1 kg), last menstrual period 2021, not currently breastfeeding. Labs:    No results found for this visit on 21.     NURSE: aretha HPI: The patient is here as she had a very heavy menstrual cycle and sought treatment. An ultrasound was performed and there is suspicion of an 8 x 4 mm polyp. For this reason it was recommended to perform an endometrial biopsy. The patient also has issues with recurrent HSV infections and would like treatment    No  PT denies fever, chills, nausea and vomiting       Objective: Ultrasound results reviewed with the patient through . The vulva and vagina and cervix all have a grossly normal appearance. Assessment and Plan: After obtaining consent through the  did proceed with prepping the cervix with Betadine by speculum exam the Pipelle was able to be placed directly into the endometrial cavity and the depth was 9 cm. A good endometrial sampling was noted. The patient did tolerate the procedure well          Diagnosis Orders   1. Endometrial polyp  [place] BIOPSY OF UTERUS LINING    Surgical Pathology             No follow-ups on file. FF: 20 minutes    There are no Patient Instructions on file for this visit. Over 50%of time spent on counseling and care coordination on: see assessment and plan,  She was also counseled on her preventative health maintenance recommendations and follow-up.       Janett Ren,1/26/2021 3:19 PM

## 2021-01-28 LAB — SURGICAL PATHOLOGY REPORT: NORMAL

## 2021-03-02 ENCOUNTER — OFFICE VISIT (OUTPATIENT)
Dept: OBGYN CLINIC | Age: 41
End: 2021-03-02

## 2021-03-02 VITALS
SYSTOLIC BLOOD PRESSURE: 108 MMHG | OXYGEN SATURATION: 96 % | HEART RATE: 102 BPM | HEIGHT: 65 IN | WEIGHT: 164 LBS | BODY MASS INDEX: 27.32 KG/M2 | DIASTOLIC BLOOD PRESSURE: 68 MMHG | TEMPERATURE: 99.2 F

## 2021-03-02 DIAGNOSIS — N84.0 ABNORMAL UTERINE BLEEDING DUE TO ENDOMETRIAL POLYP: Primary | ICD-10-CM

## 2021-03-02 DIAGNOSIS — N93.9 ABNORMAL UTERINE BLEEDING DUE TO ENDOMETRIAL POLYP: Primary | ICD-10-CM

## 2021-03-02 PROCEDURE — 99214 OFFICE O/P EST MOD 30 MIN: CPT | Performed by: OBSTETRICS & GYNECOLOGY

## 2021-03-02 NOTE — PROGRESS NOTES
PROBLEM VISIT     Date of service: 3/2/2021    Kwan Hogue  Is a 45 y.o.  female    PT's PCP is: Laurence Wright DO     : 1980                                             Subjective:       Patient's last menstrual period was 2021 (approximate). OB History    Para Term  AB Living   5 5 5 0 0 0   SAB TAB Ectopic Molar Multiple Live Births   0 0 0   0        # Outcome Date GA Lbr Shiv/2nd Weight Sex Delivery Anes PTL Lv   5 Term            4 Term            3 Term            2 Term            1 Term                 Social History     Tobacco Use   Smoking Status Never Smoker   Smokeless Tobacco Never Used        Social History     Substance and Sexual Activity   Alcohol Use No    Alcohol/week: 0.0 standard drinks       Social History     Substance and Sexual Activity   Sexual Activity Yes    Partners: Male       Allergies: Patient has no known allergies. Chief Complaint   Patient presents with    Follow-up     Patient is present today for surgical discussion regarding possible ablation. Last Yearly:  2020    Last pap: 2020    Last HPV: 2016    PE:  Vital Signs  Pulse 102, temperature 99.2 °F (37.3 °C), temperature source Infrared, height 5' 5\" (1.651 m), weight 164 lb (74.4 kg), last menstrual period 2021, SpO2 96 %, not currently breastfeeding. Estimated body mass index is 27.29 kg/m² as calculated from the following:    Height as of this encounter: 5' 5\" (1.651 m). Weight as of this encounter: 164 lb (74.4 kg). No data recorded      NURSE: EMMA     HPI: Patient is here for follow-up on recent findings. There is a suspected endometrial polyp and the patient is having prolonged and heavy bleeding. No  PT denies fever, chills, nausea and vomiting       Objective: I did review benign results of findings with the patient. Results reviewed today:    No results found for this visit on 21.

## 2021-03-02 NOTE — PATIENT INSTRUCTIONS
SURVEY:    You may be receiving a survey from [employer] regarding your visit today. Please complete the survey to enable us to provide the highest quality of care to you and your family. If you cannot score us a very good on any question, please call the office to discuss how we could have made your experience a very good one. Thank you.

## 2021-03-03 DIAGNOSIS — Z01.818 PREOP TESTING: Primary | ICD-10-CM

## 2021-04-01 ENCOUNTER — OFFICE VISIT (OUTPATIENT)
Dept: FAMILY MEDICINE CLINIC | Age: 41
End: 2021-04-01

## 2021-04-01 VITALS
WEIGHT: 170 LBS | OXYGEN SATURATION: 98 % | SYSTOLIC BLOOD PRESSURE: 102 MMHG | HEART RATE: 68 BPM | HEIGHT: 63 IN | TEMPERATURE: 98.1 F | DIASTOLIC BLOOD PRESSURE: 70 MMHG | BODY MASS INDEX: 30.12 KG/M2

## 2021-04-01 DIAGNOSIS — M54.16 LUMBAR RADICULOPATHY: ICD-10-CM

## 2021-04-01 DIAGNOSIS — M54.42 CHRONIC BILATERAL LOW BACK PAIN WITH BILATERAL SCIATICA: Primary | ICD-10-CM

## 2021-04-01 DIAGNOSIS — M54.41 CHRONIC BILATERAL LOW BACK PAIN WITH BILATERAL SCIATICA: Primary | ICD-10-CM

## 2021-04-01 DIAGNOSIS — G89.29 CHRONIC BILATERAL LOW BACK PAIN WITH BILATERAL SCIATICA: Primary | ICD-10-CM

## 2021-04-01 PROCEDURE — 99213 OFFICE O/P EST LOW 20 MIN: CPT | Performed by: NURSE PRACTITIONER

## 2021-04-01 RX ORDER — TIZANIDINE 4 MG/1
8 TABLET ORAL NIGHTLY
Qty: 28 TABLET | Refills: 0 | Status: SHIPPED | OUTPATIENT
Start: 2021-04-01 | End: 2022-09-19 | Stop reason: SDUPTHER

## 2021-04-01 RX ORDER — PREDNISONE 20 MG/1
40 TABLET ORAL DAILY
Qty: 10 TABLET | Refills: 0 | Status: SHIPPED | OUTPATIENT
Start: 2021-04-01 | End: 2021-04-06

## 2021-04-01 ASSESSMENT — ENCOUNTER SYMPTOMS
COUGH: 0
NAUSEA: 0
BOWEL INCONTINENCE: 0
BACK PAIN: 1
DIARRHEA: 0
VOMITING: 0
SHORTNESS OF BREATH: 0

## 2021-04-01 NOTE — PROGRESS NOTES
HPI Notes    Name: Shanthi Myers  : 1980         Chief Complaint:     Chief Complaint   Patient presents with   [facility]     Patient here today as a new patient to provider. Interpretor #[id number].  Back Pain     Patient here today for mid to lower back pain both sides into both hips. History of Present Illness:        Back Pain  This is a chronic problem. The current episode started more than 1 month ago. The problem occurs intermittently. The pain is present in the lumbar spine. The quality of the pain is described as aching and shooting. The pain is moderate. Stiffness is present all day. Associated symptoms include leg pain. Pertinent negatives include no bladder incontinence, bowel incontinence, chest pain, fever, headaches, numbness, perianal numbness or tingling. Risk factors include lack of exercise and obesity. She has tried NSAIDs for the symptoms. The treatment provided mild relief. Past Medical History:     Past Medical History:   Diagnosis Date    Herpes     Yeast infection       Reviewed all health maintenance requirements and ordered appropriate tests  Health Maintenance Due   Topic Date Due    Varicella vaccine (1 of 2 - 2-dose childhood series) Never done    COVID-19 Vaccine (1) Never done       Past Surgical History:     No past surgical history on file. Medications:       Prior to Admission medications    Medication Sig Start Date End Date Taking? Authorizing Provider   predniSONE (DELTASONE) 20 MG tablet Take 2 tablets by mouth daily for 5 days 21 Yes BRITTNEY Nguyen CNP   diclofenac (VOLTAREN) 50 MG EC tablet Take 1 tablet by mouth 2 times daily Do NOT take until prednisone is done.  21 Yes BRITTNEY Nguyen CNP   tiZANidine (ZANAFLEX) 4 MG tablet Take 2 tablets by mouth nightly for 14 days 4/1/21 4/15/21 Yes BRITTNEY Nguyen CNP   valACYclovir (VALTREX) 500 MG tablet Take 1 tablet by mouth daily 21 1/26/22 Yes Jillian Lima MD   acetaminophen (TYLENOL) 325 MG tablet Take 325 mg by mouth every 6 hours as needed. Yes Historical Provider, MD        Allergies:       Patient has no known allergies. Social History:     Tobacco:    reports that she has never smoked. She has never used smokeless tobacco.  Alcohol:      reports no history of alcohol use. Drug Use:  reports no history of drug use. Family History:      No family history on file. Review of Systems:         Review of Systems   Constitutional: Negative for chills and fever. Respiratory: Negative for cough and shortness of breath. Cardiovascular: Negative for chest pain and palpitations. Gastrointestinal: Negative for bowel incontinence, diarrhea, nausea and vomiting. Genitourinary: Negative for bladder incontinence. Musculoskeletal: Positive for back pain. Neurological: Negative for dizziness, tingling, seizures, numbness and headaches. Physical Exam:     Vitals:  /70   Pulse 68   Temp 98.1 °F (36.7 °C) (Oral)   Ht 5' 2.5\" (1.588 m)   Wt 170 lb (77.1 kg)   SpO2 98%   BMI 30.60 kg/m²       Physical Exam  Vitals signs and nursing note reviewed. Constitutional:       Appearance: Normal appearance. She is well-developed. Cardiovascular:      Rate and Rhythm: Normal rate and regular rhythm. Heart sounds: Normal heart sounds, S1 normal and S2 normal.   Pulmonary:      Effort: Pulmonary effort is normal. No respiratory distress. Breath sounds: Normal breath sounds. Abdominal:      General: Bowel sounds are normal.      Palpations: Abdomen is soft. Tenderness: There is no abdominal tenderness. Musculoskeletal:      Lumbar back: She exhibits pain.       Comments:  tenderness to paraspinal muscles bilat  slight increased pain with forward bend  NO increased pain with backward bend  NO increase in pain with side to side bend  slight increased pain with truncal twisting   increased pain with straight leg raised test bilat  Foot and ankle strength 5/5 bilat  Knee raise strength 5/5 bilat     Skin:     General: Skin is warm and dry. Neurological:      Mental Status: She is alert and oriented to person, place, and time. Data:     Lab Results   Component Value Date    GLUCOSE 92 12/10/2014     Lab Results   Component Value Date    WBC 5.9 03/11/2015    RBC 3.87 03/11/2015    HGB 13.2 05/27/2015    HGB 11.7 03/13/2015    HCT 35.4 03/11/2015    MCV 91.3 03/11/2015    MCH 30.1 03/11/2015    MCHC 33.0 03/11/2015    RDW 13.1 03/11/2015     03/11/2015    MPV NOT REPORTED 03/11/2015     No results found for: TSH  No results found for: CHOL, HDL, PSA, LABA1C       Assessment & Plan        Diagnosis Orders   1. Chronic bilateral low back pain with bilateral sciatica     2. Lumbar radiculopathy       Will start patient on 5 days prednisone. After that we will start Voltaren 50 mg twice daily. Patient educated about both medications. Will also start tizanidine 8 mg at bedtime. Patient educated that medication will make her sleepy. Patient educated about good back health and proper lifting technique. Patient verbalizes understanding and agreement with plan. All questions answered. If symptoms do not resolve or worsen, return to office. Completed Refills   Requested Prescriptions     Signed Prescriptions Disp Refills    predniSONE (DELTASONE) 20 MG tablet 10 tablet 0     Sig: Take 2 tablets by mouth daily for 5 days    diclofenac (VOLTAREN) 50 MG EC tablet 60 tablet 2     Sig: Take 1 tablet by mouth 2 times daily Do NOT take until prednisone is done.  tiZANidine (ZANAFLEX) 4 MG tablet 28 tablet 0     Sig: Take 2 tablets by mouth nightly for 14 days     No follow-ups on file.      Orders Placed This Encounter   Medications    predniSONE (DELTASONE) 20 MG tablet     Sig: Take 2 tablets by mouth daily for 5 days     Dispense:  10 tablet     Refill:  0    diclofenac (VOLTAREN) 50 MG EC tablet     Sig: Take 1 tablet by mouth 2 times daily Do NOT take until prednisone is done. Dispense:  60 tablet     Refill:  2    tiZANidine (ZANAFLEX) 4 MG tablet     Sig: Take 2 tablets by mouth nightly for 14 days     Dispense:  28 tablet     Refill:  0     No orders of the defined types were placed in this encounter. Patient Instructions     SURVEY:    You may be receiving a survey from [employer] regarding your visit today. Please complete the survey to enable us to provide the highest quality of care to you and your family. If you cannot score us a very good (5 Stars) on any question, please call the office to discuss how we could have made your experience a very good one. Thank you. Clinical Care Team: BIBIANA Canas LPN    Clerical Team: Rebecca Betancur    Patient Education        Ciática: Ejercicios  Sciatica: Exercises  Instrucciones de cuidado  Éstos son algunos ejemplos de ejercicios típicos de rehabilitación para alfaro afección. Comience cada ejercicio lentamente. Reduzca la intensidad del ejercicio si [place] a sentir dolor. Alfaro médico o el fisioterapeuta le dirán cuándo puede comenzar con estos ejercicios y cuáles funcionarán mejor para usted. Cuando no esté activo, encuentre nai posición cómoda para descansar. Algunas personas se sienten cómodas en el piso o en nai cama de firmeza media con nai almohada pequeña debajo de la elizabeth y otra debajo de bipin rodillas. Otras personas prefieren acostarse de lado con nai almohada entre las rodillas. No permanezca en nai posición por Darin Dash. Dé paseos cortos (10 a 20 minutos) cada 2 a 3 horas. Evite las pendientes, las colinas y las escaleras hasta que se sienta mejor. Sólo camine distancias que pueda manejar sin dolor, especialmente dolor en las piernas.   Cómo se hacen los sobre nai afección médica o sobre estas instrucciones, siempre pregunte a lyon profesional de gerson. [employer] niega toda garantía o responsabilidad por lyon uso de esta información. Electronically signed by BRITTNEY Clemente CNP on 4/1/2021 at 1:42 PM           Completed Refills      Requested Prescriptions     Signed Prescriptions Disp Refills    predniSONE (DELTASONE) 20 MG tablet 10 tablet 0     Sig: Take 2 tablets by mouth daily for 5 days    diclofenac (VOLTAREN) 50 MG EC tablet 60 tablet 2     Sig: Take 1 tablet by mouth 2 times daily Do NOT take until prednisone is done.  tiZANidine (ZANAFLEX) 4 MG tablet 28 tablet 0     Sig: Take 2 tablets by mouth nightly for 14 days         Sienna received counseling on the following healthy behaviors: nutrition, exercise and medication adherence  Reviewed prior labs and health maintenance. Continue current medications, diet and exercise. Discussed use, benefit, and side effects of prescribed medications. Barriers to medication compliance addressed. Patient given educational materials - see patient instructions. All patient questions answered. Patient voiced understanding.

## 2021-04-01 NOTE — LETTER
[address]  [address] [place], [address]  Phone: [phone]  Fax: [facility], APRN - CNP        April 1, 2021    Suzanne Roman  [address] [place] [address]      Dear Mj Sotelo: [place] prednisona jono 5 días, luego tome [employer] veces al día hasta que desaparezca el dolor. Haz los [employer] veces al día. Tenga cuidado al levantar. [place] 2 tabletas de tizanidina antes de [place]. Todos los [employer] se pueden suspender nai vez que se resuelva mariam problema. If you have any questions or concerns, please don't hesitate to call.     Sincerely,          Evelin Kennedy, APRN - CNP

## 2021-04-01 NOTE — PATIENT INSTRUCTIONS
SURVEY:    You may be receiving a survey from [employer] regarding your visit today. Please complete the survey to enable us to provide the highest quality of care to you and your family. If you cannot score us a very good (5 Stars) on any question, please call the office to discuss how we could have made your experience a very good one. Thank you. Clinical Care Team: Mickel Goldmann, APRN-MILDRED Gibbs LPN    Clerical Team: [facility]    Patient Education        Ciática: Ejercicios  Sciatica: Exercises  Instrucciones de cuidado  Éstos son algunos ejemplos de ejercicios típicos de rehabilitación para lyon afección. Comience cada ejercicio lentamente. Reduzca la intensidad del ejercicio si [place] a sentir dolor. [place] médico o el fisioterapeuta le dirán cuándo puede comenzar con estos ejercicios y cuáles funcionarán mejor para usted. Cuando no esté activo, encuentre nai posición cómoda para descansar. Algunas personas se sienten cómodas en el piso o en nai cama de firmeza media con nai almohada pequeña debajo de la elizabeth y otra debajo de bipin rodillas. Otras personas prefieren acostarse de lado con nai almohada entre las rodillas. No permanezca en nai posición por [facility]. Dé paseos cortos (10 a 20 minutos) cada 2 a 3 horas. Evite las pendientes, las colinas y las escaleras hasta que se sienta mejor. Sólo camine distancias que pueda manejar sin dolor, especialmente dolor en las piernas. Cómo se hacen los ejercicios  Estiramientos de la espalda   1. [facility] y las rodillas en el piso. 2. Relaje la elizabeth y [facility]. Arquee la espalda hacia el techo, hasta que sienta que las partes jose, media y baja se estiran agradablemente. Mantenga mariam estiramiento jono el tiempo que se sienta cómodo, o de 15 a 30 segundos.   3. Vuelva a la posición inicial con la espalda plana

## 2021-04-08 ENCOUNTER — ANESTHESIA EVENT (OUTPATIENT)
Dept: OPERATING ROOM | Age: 41
DRG: 743 | End: 2021-04-08

## 2021-04-12 ENCOUNTER — HOSPITAL ENCOUNTER (OUTPATIENT)
Age: 41
Discharge: HOME | End: 2021-04-12

## 2021-04-12 DIAGNOSIS — Z01.818 PREOP TESTING: ICD-10-CM

## 2021-04-12 LAB
ABO/RH: NORMAL
ABSOLUTE EOS #: 0.1 K/UL (ref 0–0.4)
ABSOLUTE IMMATURE GRANULOCYTE: NORMAL K/UL (ref 0–0.3)
ABSOLUTE LYMPH #: 1.7 K/UL (ref 1–4.8)
ABSOLUTE MONO #: 0.3 K/UL (ref 0–1)
ANTIBODY SCREEN: NEGATIVE
ARM BAND NUMBER: NORMAL
BASOPHILS # BLD: 1 % (ref 0–2)
BASOPHILS ABSOLUTE: 0 K/UL (ref 0–0.2)
DIFFERENTIAL TYPE: YES
EOSINOPHILS RELATIVE PERCENT: 2 % (ref 0–5)
EXPIRATION DATE: NORMAL
HCG QUALITATIVE: NEGATIVE
HCT VFR BLD CALC: 38.1 % (ref 36–46)
HEMOGLOBIN: 13 G/DL (ref 12–16)
IMMATURE GRANULOCYTES: NORMAL %
LYMPHOCYTES # BLD: 36 % (ref 15–40)
MCH RBC QN AUTO: 29.5 PG (ref 26–34)
MCHC RBC AUTO-ENTMCNC: 34.1 G/DL (ref 31–37)
MCV RBC AUTO: 86.3 FL (ref 80–100)
MONOCYTES # BLD: 7 % (ref 4–8)
NRBC AUTOMATED: NORMAL PER 100 WBC
PDW BLD-RTO: 13 % (ref 12.1–15.2)
PLATELET # BLD: 377 K/UL (ref 140–450)
PLATELET ESTIMATE: NORMAL
PMV BLD AUTO: NORMAL FL (ref 6–12)
RBC # BLD: 4.41 M/UL (ref 4–5.2)
RBC # BLD: NORMAL 10*6/UL
SEG NEUTROPHILS: 54 % (ref 47–75)
SEGMENTED NEUTROPHILS ABSOLUTE COUNT: 2.5 K/UL (ref 2.5–7)
WBC # BLD: 4.6 K/UL (ref 3.5–11)
WBC # BLD: NORMAL 10*3/UL

## 2021-04-12 PROCEDURE — 84703 CHORIONIC GONADOTROPIN ASSAY: CPT

## 2021-04-12 PROCEDURE — 87086 URINE CULTURE/COLONY COUNT: CPT

## 2021-04-12 PROCEDURE — 86900 BLOOD TYPING SEROLOGIC ABO: CPT

## 2021-04-12 PROCEDURE — 36415 COLL VENOUS BLD VENIPUNCTURE: CPT

## 2021-04-12 PROCEDURE — 85025 COMPLETE CBC W/AUTO DIFF WBC: CPT

## 2021-04-12 PROCEDURE — 86901 BLOOD TYPING SEROLOGIC RH(D): CPT

## 2021-04-12 PROCEDURE — 86850 RBC ANTIBODY SCREEN: CPT

## 2021-04-12 NOTE — H&P
Negative. Thyroid is not enlarged. LUNGS:  Clear. HEART:  Regular rate and rhythm without murmur. BACK:  Without deformity. BREASTS:  Without masses. ABDOMEN:  Soft and nontender. No masses. GENITALIA:  The vulva, vagina and cervix all have a normal appearance. The uterus is anteverted, anteflexed; normal size, shape and contour. The adnexa are nontender and not enlarged. EXTREMITIES:  Without significant edema or varicosities. NEUROLOGIC EXAM:  Grossly normal.    LABORATORY DATA:  As follows; endometrial biopsy revealed disordered  proliferative endometrium and endometrial polyp. Pap smear was  negative. Ultrasound exam did confirm an 8 x 4 mm hypoechoic area  consistent with the polyp. ASSESSMENT:  Menorrhagia secondary to endometrial polyp. PLAN:  To perform total vaginal hysterectomy and bilateral salpingectomy  on the morning of 04/13/2021.         Erika Dang    D: 04/12/2021 10:19:44       T: 04/12/2021 10:24:23     [email or web address]  Job#: [id number]     Doc#: [id number]    CC:

## 2021-04-13 ENCOUNTER — ANESTHESIA (OUTPATIENT)
Dept: OPERATING ROOM | Age: 41
DRG: 743 | End: 2021-04-13

## 2021-04-13 ENCOUNTER — HOSPITAL ENCOUNTER (INPATIENT)
Age: 41
LOS: 1 days | Discharge: HOME | DRG: 743 | End: 2021-04-14
Attending: OBSTETRICS & GYNECOLOGY | Admitting: OBSTETRICS & GYNECOLOGY
Payer: MEDICAID

## 2021-04-13 ENCOUNTER — HOSPITAL ENCOUNTER (OUTPATIENT)
Dept: PREADMISSION TESTING | Age: 41
Setting detail: SPECIMEN
Discharge: HOME | DRG: 743 | End: 2021-04-13

## 2021-04-13 VITALS
DIASTOLIC BLOOD PRESSURE: 87 MMHG | OXYGEN SATURATION: 99 % | TEMPERATURE: 98.6 F | SYSTOLIC BLOOD PRESSURE: 118 MMHG | RESPIRATION RATE: 21 BRPM

## 2021-04-13 DIAGNOSIS — Z90.710 S/P VAGINAL HYSTERECTOMY: Primary | ICD-10-CM

## 2021-04-13 PROBLEM — Z87.42 HX OF MENORRHAGIA: Status: ACTIVE | Noted: 2021-04-13

## 2021-04-13 LAB
-: ABNORMAL
AMORPHOUS: ABNORMAL
BACTERIA: ABNORMAL
BILIRUBIN URINE: NEGATIVE
CASTS UA: ABNORMAL /LPF
COLOR: ABNORMAL
COMMENT UA: ABNORMAL
CRYSTALS, UA: ABNORMAL /HPF
CULTURE: NORMAL
EPITHELIAL CELLS UA: ABNORMAL /HPF
GLUCOSE URINE: NEGATIVE
HCT VFR BLD CALC: 37.7 % (ref 36–46)
HEMOGLOBIN: 12.8 G/DL (ref 12–16)
KETONES, URINE: NEGATIVE
LEUKOCYTE ESTERASE, URINE: NEGATIVE
Lab: NORMAL
MUCUS: ABNORMAL
NITRITE, URINE: NEGATIVE
OTHER OBSERVATIONS UA: ABNORMAL
PH UA: 7 (ref 5–8)
PROTEIN UA: NEGATIVE
RBC UA: ABNORMAL /HPF (ref 0–2)
RENAL EPITHELIAL, UA: ABNORMAL /HPF
SARS-COV-2, RAPID: NOT DETECTED
SPECIFIC GRAVITY UA: 1.01 (ref 1–1.03)
SPECIMEN DESCRIPTION: NORMAL
SPECIMEN DESCRIPTION: NORMAL
TRICHOMONAS: ABNORMAL
TURBIDITY: CLEAR
URINE HGB: ABNORMAL
UROBILINOGEN, URINE: NORMAL
WBC UA: ABNORMAL /HPF
YEAST: ABNORMAL

## 2021-04-13 PROCEDURE — 85018 HEMOGLOBIN: CPT

## 2021-04-13 PROCEDURE — 6360000002 HC RX W HCPCS: Performed by: NURSE ANESTHETIST, CERTIFIED REGISTERED

## 2021-04-13 PROCEDURE — 6370000000 HC RX 637 (ALT 250 FOR IP): Performed by: OBSTETRICS & GYNECOLOGY

## 2021-04-13 PROCEDURE — 2580000003 HC RX 258: Performed by: OBSTETRICS & GYNECOLOGY

## 2021-04-13 PROCEDURE — 88307 TISSUE EXAM BY PATHOLOGIST: CPT

## 2021-04-13 PROCEDURE — 94761 N-INVAS EAR/PLS OXIMETRY MLT: CPT

## 2021-04-13 PROCEDURE — 3600000015 HC SURGERY LEVEL 5 ADDTL 15MIN: Performed by: OBSTETRICS & GYNECOLOGY

## 2021-04-13 PROCEDURE — 1200000000 HC SEMI PRIVATE

## 2021-04-13 PROCEDURE — 3600000005 HC SURGERY LEVEL 5 BASE: Performed by: OBSTETRICS & GYNECOLOGY

## 2021-04-13 PROCEDURE — 0UT97ZZ RESECTION OF UTERUS, VIA NATURAL OR ARTIFICIAL OPENING: ICD-10-PCS | Performed by: OBSTETRICS & GYNECOLOGY

## 2021-04-13 PROCEDURE — 6360000002 HC RX W HCPCS: Performed by: OBSTETRICS & GYNECOLOGY

## 2021-04-13 PROCEDURE — 85014 HEMATOCRIT: CPT

## 2021-04-13 PROCEDURE — 2500000003 HC RX 250 WO HCPCS: Performed by: NURSE ANESTHETIST, CERTIFIED REGISTERED

## 2021-04-13 PROCEDURE — 3700000000 HC ANESTHESIA ATTENDED CARE: Performed by: OBSTETRICS & GYNECOLOGY

## 2021-04-13 PROCEDURE — 7100000000 HC PACU RECOVERY - FIRST 15 MIN: Performed by: OBSTETRICS & GYNECOLOGY

## 2021-04-13 PROCEDURE — 81001 URINALYSIS AUTO W/SCOPE: CPT

## 2021-04-13 PROCEDURE — 7100000001 HC PACU RECOVERY - ADDTL 15 MIN: Performed by: OBSTETRICS & GYNECOLOGY

## 2021-04-13 PROCEDURE — 0UT70ZZ RESECTION OF BILATERAL FALLOPIAN TUBES, OPEN APPROACH: ICD-10-PCS | Performed by: OBSTETRICS & GYNECOLOGY

## 2021-04-13 PROCEDURE — C9803 HOPD COVID-19 SPEC COLLECT: HCPCS

## 2021-04-13 PROCEDURE — 87635 SARS-COV-2 COVID-19 AMP PRB: CPT

## 2021-04-13 PROCEDURE — 58262 VAG HYST INCLUDING T/O: CPT | Performed by: OBSTETRICS & GYNECOLOGY

## 2021-04-13 PROCEDURE — 2709999900 HC NON-CHARGEABLE SUPPLY: Performed by: OBSTETRICS & GYNECOLOGY

## 2021-04-13 PROCEDURE — 36415 COLL VENOUS BLD VENIPUNCTURE: CPT

## 2021-04-13 PROCEDURE — 3700000001 HC ADD 15 MINUTES (ANESTHESIA): Performed by: OBSTETRICS & GYNECOLOGY

## 2021-04-13 RX ORDER — ACYCLOVIR 200 MG/1
400 CAPSULE ORAL 2 TIMES DAILY
Status: DISCONTINUED | OUTPATIENT
Start: 2021-04-13 | End: 2021-04-14 | Stop reason: HOSPADM

## 2021-04-13 RX ORDER — ONDANSETRON 2 MG/ML
INJECTION INTRAMUSCULAR; INTRAVENOUS PRN
Status: DISCONTINUED | OUTPATIENT
Start: 2021-04-13 | End: 2021-04-13 | Stop reason: SDUPTHER

## 2021-04-13 RX ORDER — GLYCOPYRROLATE 1 MG/5 ML
SYRINGE (ML) INTRAVENOUS PRN
Status: DISCONTINUED | OUTPATIENT
Start: 2021-04-13 | End: 2021-04-13 | Stop reason: SDUPTHER

## 2021-04-13 RX ORDER — NALOXONE HYDROCHLORIDE 0.4 MG/ML
0.4 INJECTION, SOLUTION INTRAMUSCULAR; INTRAVENOUS; SUBCUTANEOUS PRN
Status: DISCONTINUED | OUTPATIENT
Start: 2021-04-13 | End: 2021-04-14

## 2021-04-13 RX ORDER — OXYCODONE HYDROCHLORIDE AND ACETAMINOPHEN 5; 325 MG/1; MG/1
2 TABLET ORAL EVERY 4 HOURS PRN
Status: DISCONTINUED | OUTPATIENT
Start: 2021-04-13 | End: 2021-04-14 | Stop reason: HOSPADM

## 2021-04-13 RX ORDER — PROPOFOL 10 MG/ML
INJECTION, EMULSION INTRAVENOUS PRN
Status: DISCONTINUED | OUTPATIENT
Start: 2021-04-13 | End: 2021-04-13 | Stop reason: SDUPTHER

## 2021-04-13 RX ORDER — MIDAZOLAM HYDROCHLORIDE 2 MG/2ML
INJECTION, SOLUTION INTRAMUSCULAR; INTRAVENOUS PRN
Status: DISCONTINUED | OUTPATIENT
Start: 2021-04-13 | End: 2021-04-13 | Stop reason: SDUPTHER

## 2021-04-13 RX ORDER — CEFAZOLIN SODIUM 2 G/50ML
2000 SOLUTION INTRAVENOUS
Status: COMPLETED | OUTPATIENT
Start: 2021-04-13 | End: 2021-04-13

## 2021-04-13 RX ORDER — SODIUM CHLORIDE, SODIUM LACTATE, POTASSIUM CHLORIDE, CALCIUM CHLORIDE 600; 310; 30; 20 MG/100ML; MG/100ML; MG/100ML; MG/100ML
INJECTION, SOLUTION INTRAVENOUS CONTINUOUS
Status: DISCONTINUED | OUTPATIENT
Start: 2021-04-13 | End: 2021-04-13

## 2021-04-13 RX ORDER — SODIUM CHLORIDE, SODIUM LACTATE, POTASSIUM CHLORIDE, CALCIUM CHLORIDE 600; 310; 30; 20 MG/100ML; MG/100ML; MG/100ML; MG/100ML
INJECTION, SOLUTION INTRAVENOUS CONTINUOUS
Status: DISCONTINUED | OUTPATIENT
Start: 2021-04-13 | End: 2021-04-14 | Stop reason: HOSPADM

## 2021-04-13 RX ORDER — FENTANYL CITRATE 50 UG/ML
INJECTION, SOLUTION INTRAMUSCULAR; INTRAVENOUS PRN
Status: DISCONTINUED | OUTPATIENT
Start: 2021-04-13 | End: 2021-04-13 | Stop reason: SDUPTHER

## 2021-04-13 RX ORDER — CEFAZOLIN SODIUM 2 G/50ML
2000 SOLUTION INTRAVENOUS EVERY 8 HOURS
Status: COMPLETED | OUTPATIENT
Start: 2021-04-13 | End: 2021-04-14

## 2021-04-13 RX ORDER — HYDROMORPHONE HCL 110MG/55ML
PATIENT CONTROLLED ANALGESIA SYRINGE INTRAVENOUS PRN
Status: DISCONTINUED | OUTPATIENT
Start: 2021-04-13 | End: 2021-04-13 | Stop reason: SDUPTHER

## 2021-04-13 RX ORDER — SODIUM CHLORIDE 9 MG/ML
25 INJECTION, SOLUTION INTRAVENOUS PRN
Status: DISCONTINUED | OUTPATIENT
Start: 2021-04-13 | End: 2021-04-14 | Stop reason: HOSPADM

## 2021-04-13 RX ORDER — SODIUM CHLORIDE 0.9 % (FLUSH) 0.9 %
10 SYRINGE (ML) INJECTION EVERY 12 HOURS SCHEDULED
Status: DISCONTINUED | OUTPATIENT
Start: 2021-04-13 | End: 2021-04-14 | Stop reason: HOSPADM

## 2021-04-13 RX ORDER — KETOROLAC TROMETHAMINE 30 MG/ML
INJECTION, SOLUTION INTRAMUSCULAR; INTRAVENOUS PRN
Status: DISCONTINUED | OUTPATIENT
Start: 2021-04-13 | End: 2021-04-13 | Stop reason: SDUPTHER

## 2021-04-13 RX ORDER — MORPHINE SULFATE/0.9% NACL/PF 1 MG/ML
SYRINGE (ML) INJECTION CONTINUOUS
Status: DISCONTINUED | OUTPATIENT
Start: 2021-04-13 | End: 2021-04-14

## 2021-04-13 RX ORDER — ONDANSETRON 2 MG/ML
4 INJECTION INTRAMUSCULAR; INTRAVENOUS EVERY 6 HOURS PRN
Status: DISCONTINUED | OUTPATIENT
Start: 2021-04-13 | End: 2021-04-14 | Stop reason: HOSPADM

## 2021-04-13 RX ORDER — ROCURONIUM BROMIDE 10 MG/ML
INJECTION, SOLUTION INTRAVENOUS PRN
Status: DISCONTINUED | OUTPATIENT
Start: 2021-04-13 | End: 2021-04-13 | Stop reason: SDUPTHER

## 2021-04-13 RX ORDER — OXYCODONE HYDROCHLORIDE AND ACETAMINOPHEN 5; 325 MG/1; MG/1
1 TABLET ORAL EVERY 4 HOURS PRN
Status: DISCONTINUED | OUTPATIENT
Start: 2021-04-13 | End: 2021-04-14 | Stop reason: HOSPADM

## 2021-04-13 RX ORDER — LIDOCAINE HYDROCHLORIDE 10 MG/ML
INJECTION, SOLUTION EPIDURAL; INFILTRATION; INTRACAUDAL; PERINEURAL PRN
Status: DISCONTINUED | OUTPATIENT
Start: 2021-04-13 | End: 2021-04-13 | Stop reason: SDUPTHER

## 2021-04-13 RX ORDER — SODIUM CHLORIDE 0.9 % (FLUSH) 0.9 %
10 SYRINGE (ML) INJECTION PRN
Status: DISCONTINUED | OUTPATIENT
Start: 2021-04-13 | End: 2021-04-14 | Stop reason: HOSPADM

## 2021-04-13 RX ORDER — ACETAMINOPHEN 325 MG/1
650 TABLET ORAL EVERY 4 HOURS PRN
Status: DISCONTINUED | OUTPATIENT
Start: 2021-04-13 | End: 2021-04-14 | Stop reason: HOSPADM

## 2021-04-13 RX ORDER — PROMETHAZINE HYDROCHLORIDE 25 MG/1
12.5 TABLET ORAL EVERY 6 HOURS PRN
Status: DISCONTINUED | OUTPATIENT
Start: 2021-04-13 | End: 2021-04-14 | Stop reason: HOSPADM

## 2021-04-13 RX ORDER — SUCCINYLCHOLINE/SOD CL,ISO/PF 100 MG/5ML
SYRINGE (ML) INTRAVENOUS PRN
Status: DISCONTINUED | OUTPATIENT
Start: 2021-04-13 | End: 2021-04-13 | Stop reason: SDUPTHER

## 2021-04-13 RX ADMIN — PROPOFOL 180 MG: 10 INJECTION, EMULSION INTRAVENOUS at 08:19

## 2021-04-13 RX ADMIN — ROCURONIUM BROMIDE 10 MG: 10 INJECTION, SOLUTION INTRAVENOUS at 08:19

## 2021-04-13 RX ADMIN — ONDANSETRON 4 MG: 2 INJECTION INTRAMUSCULAR; INTRAVENOUS at 09:23

## 2021-04-13 RX ADMIN — Medication 80 MG: at 08:19

## 2021-04-13 RX ADMIN — MORPHINE SULFATE 30 MG: 1 INJECTION INTRAVENOUS at 10:53

## 2021-04-13 RX ADMIN — Medication 0.4 MG: at 08:53

## 2021-04-13 RX ADMIN — LIDOCAINE HYDROCHLORIDE 100 MG: 10 INJECTION, SOLUTION EPIDURAL; INFILTRATION; INTRACAUDAL; PERINEURAL at 08:19

## 2021-04-13 RX ADMIN — KETOROLAC TROMETHAMINE 30 MG: 30 INJECTION, SOLUTION INTRAMUSCULAR at 09:56

## 2021-04-13 RX ADMIN — FENTANYL CITRATE 50 MCG: 50 INJECTION, SOLUTION INTRAMUSCULAR; INTRAVENOUS at 09:09

## 2021-04-13 RX ADMIN — FENTANYL CITRATE 25 MCG: 50 INJECTION, SOLUTION INTRAMUSCULAR; INTRAVENOUS at 08:57

## 2021-04-13 RX ADMIN — ACYCLOVIR 400 MG: 200 CAPSULE ORAL at 20:20

## 2021-04-13 RX ADMIN — MIDAZOLAM HYDROCHLORIDE 2 MG: 1 INJECTION, SOLUTION INTRAMUSCULAR; INTRAVENOUS at 08:11

## 2021-04-13 RX ADMIN — FENTANYL CITRATE 50 MCG: 50 INJECTION, SOLUTION INTRAMUSCULAR; INTRAVENOUS at 08:11

## 2021-04-13 RX ADMIN — CEFAZOLIN SODIUM 2000 MG: 2 SOLUTION INTRAVENOUS at 17:07

## 2021-04-13 RX ADMIN — ONDANSETRON 4 MG: 2 INJECTION, SOLUTION INTRAMUSCULAR; INTRAVENOUS at 17:54

## 2021-04-13 RX ADMIN — SODIUM CHLORIDE, POTASSIUM CHLORIDE, SODIUM LACTATE AND CALCIUM CHLORIDE: 600; 310; 30; 20 INJECTION, SOLUTION INTRAVENOUS at 09:15

## 2021-04-13 RX ADMIN — FENTANYL CITRATE 50 MCG: 50 INJECTION, SOLUTION INTRAMUSCULAR; INTRAVENOUS at 08:19

## 2021-04-13 RX ADMIN — CEFAZOLIN SODIUM 2000 MG: 2 SOLUTION INTRAVENOUS at 08:08

## 2021-04-13 RX ADMIN — HYDROMORPHONE HYDROCHLORIDE 0.5 MG: 2 INJECTION, SOLUTION INTRAMUSCULAR; INTRAVENOUS; SUBCUTANEOUS at 09:59

## 2021-04-13 RX ADMIN — FENTANYL CITRATE 25 MCG: 50 INJECTION, SOLUTION INTRAMUSCULAR; INTRAVENOUS at 08:56

## 2021-04-13 RX ADMIN — HYDROMORPHONE HYDROCHLORIDE 1 MG: 2 INJECTION, SOLUTION INTRAMUSCULAR; INTRAVENOUS; SUBCUTANEOUS at 10:45

## 2021-04-13 RX ADMIN — SODIUM CHLORIDE, POTASSIUM CHLORIDE, SODIUM LACTATE AND CALCIUM CHLORIDE: 600; 310; 30; 20 INJECTION, SOLUTION INTRAVENOUS at 23:19

## 2021-04-13 RX ADMIN — HYDROMORPHONE HYDROCHLORIDE 0.5 MG: 2 INJECTION, SOLUTION INTRAMUSCULAR; INTRAVENOUS; SUBCUTANEOUS at 09:24

## 2021-04-13 RX ADMIN — OXYCODONE AND ACETAMINOPHEN 1 TABLET: 5; 325 TABLET ORAL at 20:20

## 2021-04-13 RX ADMIN — SODIUM CHLORIDE, POTASSIUM CHLORIDE, SODIUM LACTATE AND CALCIUM CHLORIDE: 600; 310; 30; 20 INJECTION, SOLUTION INTRAVENOUS at 07:55

## 2021-04-13 ASSESSMENT — PAIN SCALES - GENERAL
PAINLEVEL_OUTOF10: 4
PAINLEVEL_OUTOF10: 2
PAINLEVEL_OUTOF10: 6
PAINLEVEL_OUTOF10: 7
PAINLEVEL_OUTOF10: 5
PAINLEVEL_OUTOF10: 4
PAINLEVEL_OUTOF10: 5
PAINLEVEL_OUTOF10: 2

## 2021-04-13 ASSESSMENT — PAIN DESCRIPTION - PAIN TYPE: TYPE: SURGICAL PAIN

## 2021-04-13 NOTE — PROGRESS NOTES
Pt arrives in bed,  at bedside. PCa pump verified with two RN's. Morphine in PCA pump. Pt able to rate pain 3/10.  phone at bedside. Electronically signed by Tamera Zarate RN on 4/13/2021 at 11:45 AM

## 2021-04-13 NOTE — PROGRESS NOTES
PCA started pain 5/10 at this time. instructed pre-op how to use per Artem Espinal RN and pt nods understanding when given PCA control.

## 2021-04-13 NOTE — PROGRESS NOTES
Pt relates of pain in abdomen.  used to communicate with patient using PCA and how often she can use it. Pt verbalized understanding.

## 2021-04-13 NOTE — PROGRESS NOTES
[id number] used to translate; pt is reporting feeling pressure in her bladder and like she needs to urinate; campos catheter repositioned and 400 mls of clear yellow urine drained at this time. Pt reports she is nauseated from trying to eat jello; pt is medicated with PRN Zofran; educated on PCA use and verbalizes understanding. Pt is repositioned for comfort with HOB supine.

## 2021-04-13 NOTE — ANESTHESIA PRE PROCEDURE
Pulmonary:Negative Pulmonary ROS and normal exam  breath sounds clear to auscultation                             Cardiovascular:Negative CV ROS  Exercise tolerance: good (>4 METS),           Rhythm: regular  Rate: normal                    Neuro/Psych:   Negative Neuro/Psych ROS              GI/Hepatic/Renal: Neg GI/Hepatic/Renal ROS            Endo/Other: Negative Endo/Other ROS                     ROS comment: Patient reports lower back pain, \"sciatica\". Abdominal:   (+) obese,     Abdomen: soft. Vascular: negative vascular ROS. Anesthesia Plan      general     ASA 1       Induction: intravenous. MIPS: Postoperative opioids intended and Prophylactic antiemetics administered. Anesthetic plan and risks discussed with patient. Use of blood products discussed with patient whom. Plan discussed with attending.                   BRITTNEY Weir - CRNA   4/13/2021

## 2021-04-13 NOTE — PROGRESS NOTES
phone used for plan of care, pain discussion, assessment, education of use of PCA pump, oral intake, upcoming events and expectations for pain level. IC # documented.  Electronically signed by Larry Mandujano RN on 4/13/2021 at 12:13 PM

## 2021-04-13 NOTE — PROGRESS NOTES
Pt has nausea without emesis.  Electronically signed by Jackelyn Livingston RN on 4/13/2021 at 12:21 PM 09-Feb-2021 11:13

## 2021-04-14 VITALS
HEART RATE: 60 BPM | WEIGHT: 172.3 LBS | TEMPERATURE: 98 F | BODY MASS INDEX: 31.71 KG/M2 | OXYGEN SATURATION: 96 % | SYSTOLIC BLOOD PRESSURE: 100 MMHG | DIASTOLIC BLOOD PRESSURE: 42 MMHG | HEIGHT: 62 IN | RESPIRATION RATE: 16 BRPM

## 2021-04-14 LAB
ABSOLUTE EOS #: 0 K/UL (ref 0–0.4)
ABSOLUTE IMMATURE GRANULOCYTE: ABNORMAL K/UL (ref 0–0.3)
ABSOLUTE LYMPH #: 0.9 K/UL (ref 1–4.8)
ABSOLUTE MONO #: 0.2 K/UL (ref 0–1)
BASOPHILS # BLD: 0 % (ref 0–2)
BASOPHILS ABSOLUTE: 0 K/UL (ref 0–0.2)
DIFFERENTIAL TYPE: YES
EOSINOPHILS RELATIVE PERCENT: 0 % (ref 0–5)
HCT VFR BLD CALC: 35.2 % (ref 36–46)
HEMOGLOBIN: 11.7 G/DL (ref 12–16)
IMMATURE GRANULOCYTES: ABNORMAL %
LYMPHOCYTES # BLD: 9 % (ref 15–40)
MCH RBC QN AUTO: 29.1 PG (ref 26–34)
MCHC RBC AUTO-ENTMCNC: 33.2 G/DL (ref 31–37)
MCV RBC AUTO: 87.8 FL (ref 80–100)
MONOCYTES # BLD: 2 % (ref 4–8)
NRBC AUTOMATED: ABNORMAL PER 100 WBC
PDW BLD-RTO: 12.6 % (ref 12.1–15.2)
PLATELET # BLD: 335 K/UL (ref 140–450)
PLATELET ESTIMATE: ABNORMAL
PMV BLD AUTO: ABNORMAL FL (ref 6–12)
RBC # BLD: 4 M/UL (ref 4–5.2)
RBC # BLD: ABNORMAL 10*6/UL
SEG NEUTROPHILS: 89 % (ref 47–75)
SEGMENTED NEUTROPHILS ABSOLUTE COUNT: 9.1 K/UL (ref 2.5–7)
SURGICAL PATHOLOGY REPORT: NORMAL
WBC # BLD: 10.3 K/UL (ref 3.5–11)
WBC # BLD: ABNORMAL 10*3/UL

## 2021-04-14 PROCEDURE — 94761 N-INVAS EAR/PLS OXIMETRY MLT: CPT

## 2021-04-14 PROCEDURE — 2580000003 HC RX 258: Performed by: OBSTETRICS & GYNECOLOGY

## 2021-04-14 PROCEDURE — 36415 COLL VENOUS BLD VENIPUNCTURE: CPT

## 2021-04-14 PROCEDURE — 6370000000 HC RX 637 (ALT 250 FOR IP): Performed by: OBSTETRICS & GYNECOLOGY

## 2021-04-14 PROCEDURE — 6360000002 HC RX W HCPCS: Performed by: OBSTETRICS & GYNECOLOGY

## 2021-04-14 PROCEDURE — 85025 COMPLETE CBC W/AUTO DIFF WBC: CPT

## 2021-04-14 RX ORDER — OXYCODONE HYDROCHLORIDE AND ACETAMINOPHEN 5; 325 MG/1; MG/1
2 TABLET ORAL EVERY 6 HOURS PRN
Qty: 28 TABLET | Refills: 0 | Status: SHIPPED | OUTPATIENT
Start: 2021-04-14 | End: 2021-04-21

## 2021-04-14 RX ORDER — DOCUSATE SODIUM 100 MG/1
100 CAPSULE, LIQUID FILLED ORAL 2 TIMES DAILY
Qty: 60 CAPSULE | Refills: 0 | Status: SHIPPED | OUTPATIENT
Start: 2021-04-14 | End: 2021-05-14

## 2021-04-14 RX ADMIN — ONDANSETRON 4 MG: 2 INJECTION, SOLUTION INTRAMUSCULAR; INTRAVENOUS at 09:04

## 2021-04-14 RX ADMIN — SODIUM CHLORIDE, POTASSIUM CHLORIDE, SODIUM LACTATE AND CALCIUM CHLORIDE: 600; 310; 30; 20 INJECTION, SOLUTION INTRAVENOUS at 09:05

## 2021-04-14 RX ADMIN — OXYCODONE AND ACETAMINOPHEN 2 TABLET: 5; 325 TABLET ORAL at 09:27

## 2021-04-14 RX ADMIN — SODIUM CHLORIDE, PRESERVATIVE FREE 10 ML: 5 INJECTION INTRAVENOUS at 09:04

## 2021-04-14 RX ADMIN — ENOXAPARIN SODIUM 40 MG: 40 INJECTION SUBCUTANEOUS at 09:03

## 2021-04-14 RX ADMIN — ACYCLOVIR 400 MG: 200 CAPSULE ORAL at 09:04

## 2021-04-14 RX ADMIN — CEFAZOLIN SODIUM 2000 MG: 2 SOLUTION INTRAVENOUS at 06:20

## 2021-04-14 RX ADMIN — MORPHINE SULFATE 30 MG: 1 INJECTION INTRAVENOUS at 04:30

## 2021-04-14 ASSESSMENT — PAIN SCALES - GENERAL
PAINLEVEL_OUTOF10: 7
PAINLEVEL_OUTOF10: 9
PAINLEVEL_OUTOF10: 5
PAINLEVEL_OUTOF10: 2

## 2021-04-14 ASSESSMENT — PAIN DESCRIPTION - PAIN TYPE
TYPE: SURGICAL PAIN
TYPE: SURGICAL PAIN

## 2021-04-14 NOTE — PROGRESS NOTES
Patient ambulated in marley with this RN. Steady gait. No assistive device used. Patient tolerated well and returned to chair.

## 2021-04-14 NOTE — PROGRESS NOTES
Urinary campos cath and vaginal packing removed at this time with no complications. Pt expressed mild discomfort during removal.  No bleeding, redness, or edema around surrounding vaginal area. Will attempt to ambulate patient shortly. Will continue to monitor.

## 2021-04-14 NOTE — PROGRESS NOTES
Todd and packing out this am with minimal bleeding. Able to void without difficulty. OK to discharge per Dr. Angel Lopez.

## 2021-04-14 NOTE — PROGRESS NOTES
PCA dose verified with Edy Cervantes and Milana Bethea RN. Patient resting comfortably. Call light in reach.

## 2021-04-14 NOTE — DISCHARGE SUMMARY
soft, non distended. Deep palpation not performed since she is post op. Extremities: extremities normal, atraumatic, no cyanosis or edema and SCD's on. Neurologic: Mental status: Alert, oriented, thought content appropriate    Discharge Medications:       Homero Simon   Home Medication Instructions VAISHALI:[id number]    Printed on:04/14/21 0716   Medication Information                      acetaminophen (TYLENOL) 325 MG tablet  Take 325 mg by mouth every 6 hours as needed. diclofenac (VOLTAREN) 50 MG EC tablet  Take 1 tablet by mouth 2 times daily Do NOT take until prednisone is done. docusate sodium (COLACE) 100 MG capsule  Take 1 capsule by mouth 2 times daily             oxyCODONE-acetaminophen (PERCOCET) 5-325 MG per tablet  Take 2 tablets by mouth every 6 hours as needed for Pain (1 tablet of moderate pain, 2 tablets for severe pain.) for up to 7 days. Intended supply: 7 days. Take lowest dose possible to manage pain             tiZANidine (ZANAFLEX) 4 MG tablet  Take 2 tablets by mouth nightly for 14 days             valACYclovir (VALTREX) 500 MG tablet  Take 1 tablet by mouth daily                 Consults:  None. Significant Diagnostic Studies:  Labs. Treatments:   PCA, Oral Percocet, Incentive spirometry, LR, PRN Zofran. Disposition:   Home. Discharge Instructions:  Ambulate in the home every hour while awake. Perform incentive spirometer every 2 hours while awake. Resume previous home medication but do not take Voltaren until cleared to start it back by Dr. Sol Harris. Take Percocet 5/325:  1 tablet every 6 hours for moderate pain or 2 tablets every 6 hours for severe pain. Take Colace 100 mg two times a day to help prevent constipation. Wound care as directed by Dr. Sol Harris. Follow Up: With Dr. Sol Harris  in 1 week. Discharge time =  25 minutes.      SignedElisa McElhattan Back  4/14/2021, 5:39 AM

## 2021-04-14 NOTE — PROGRESS NOTES
Has been using PCA for pain relief getting pain to tolerable 3/10. When she experiences pain, she uses PCA and it brings her pain level down to a tolerable level.

## 2021-04-14 NOTE — PROGRESS NOTES
SW met with pt and spouse to complete assessment during quality rounds this morning with RN case manager. Pt is alert and oriented and cooperative with assessment.  iPad used with number [phone]. Pt is a 45year old  female admitted for vaginal hysterectomy. Pt lives in her home in [facility] [place] [facility] with her spouse, children, niece and niece's baby. Pt does not use any DME or community services. Pt reports that she is able to drive to her appointments. Pt is a full code and does not have advance directives and is not interested in completing these at this time. Pt follows with Dr Daryl Birmingham as PCP and Dr Lona Weaver was her surgeon. Pt reports that she does not have medical insurance and she has paid for her medications out of pocket in the past and managed them ok but would be willing to allow for some assistance. SW called to [facility] and arranged for pt's medications to be covered for her. Pt plans to return home with her family at discharge this afternoon. SW provided HELP paperwork to assist with medicaid application and returned these to [facility] after pt signed them. No further discharge needs or concerns noted by pt or spouse. SW will remain available as needed.  Sandor TURNERW 4/14/2021

## 2021-04-14 NOTE — PLAN OF CARE
Problem: Urinary Elimination - Impaired:  Goal: Ability to achieve and maintain adequate urine output will improve to within specified parameters  Description: Ability to achieve and maintain adequate urine output will improve to within specified parameters  Outcome: Met This Shift     Problem: Venous Thromboembolism:  Goal: Will show no signs or symptoms of venous thromboembolism  Description: Will show no signs or symptoms of venous thromboembolism  Outcome: Met This Shift  Goal: Absence of signs or symptoms of impaired coagulation  Description: Absence of signs or symptoms of impaired coagulation  Outcome: Met This Shift     Problem: Nausea/Vomiting:  Goal: Able to drink  Description: Able to drink  Outcome: Met This Shift     Problem: Pain:  Goal: Pain level will decrease  Description: Pain level will decrease  Outcome: Ongoing     Problem: Pain - Acute:  Goal: Pain level will decrease  Description: Pain level will decrease  Outcome: Ongoing     Problem:  Activity Intolerance:  Goal: Ability to tolerate increased activity will improve  Description: Ability to tolerate increased activity will improve  Outcome: Ongoing     Problem: Mobility - Impaired:  Goal: Mobility will improve to maximum level  Description: Mobility will improve to maximum level  Outcome: Ongoing     Problem: Pain:  Goal: Pain level will decrease  Description: Pain level will decrease  Outcome: Ongoing     Problem: Nausea/Vomiting:  Goal: Absence of nausea/vomiting  Description: Absence of nausea/vomiting  Outcome: Ongoing

## 2021-04-14 NOTE — OP NOTE
[place] [address]                                OPERATIVE REPORT    PATIENT NAME: Jossie Quinn                     :        1980  MED REC NO:   [id number]                              ROOM:       [id number]  ACCOUNT NO:   [de-identified]                           ADMIT DATE: 2021  PROVIDER:     Mario Payan    DATE OF PROCEDURE:  2021    PREOPERATIVE DIAGNOSIS:  Heavy dysfunctional uterine bleeding and  suspected endometrial polyp with adequate uterine descent. POSTOPERATIVE DIAGNOSIS:  Heavy dysfunctional uterine bleeding and  suspected endometrial polyp with adequate uterine descent, pending final  surgical pathology. SURGICAL PROCEDURES:  Total vaginal hysterectomy with bilateral  salpingectomy. ANESTHESIA:  General.    ESTIMATED BLOOD LOSS:  250 mL. COMPLICATIONS OF THE PROCEDURE:  None. FINDINGS:  Is that of a normal sized and appearing uterus and grossly  normal-appearing tubes. Minimal cystocele. No appreciable rectocele  and mild to moderate uterine prolapse was noted preoperatively. PROCEDURE:  The patient was taken to the operating room, general  anesthesia was administered and the patient was in the dorsal lithotomy  position where she did undergo perineal prepping, vaginal prepping,  drainage of the bladder and appropriate draping and iodoform wrap was  also placed over the perineum. Then, retractors were used to visualize  the cervix and four-toothed tenaculum was then affixed to the cervix. Scalpel was used to make a circumferential incision around the cervix  and scissors were used then to extend to the endopelvic fascia to  mobilize the mucosa of the vagina posteriorly. The first thing that was  done after this was getting into the posterior cul-de-sac.   This was  done directly with sharp scissors and the peritoneum was fixed to the noted to be excellent over this. Similarly, the same thing  was performed on the right side, removing the tubes bilaterally. Of  note, there was some bleeding from the pedicle on the right side. This  was minimal and a 2-0 Vicryl was used in a running interlocking fashion  along the raw surface of the pedicle to the peritoneum and this gave  excellent hemostasis. Similarly, this needed to be performed on the  left side as well. This offered excellent hemostasis around the area of  these pedicles. Some cautery was used over the surface on the cuff as  well and transfixion sutures were then placed with the 0 Vicryl ligating  the uterosacral ligaments to the lateral vaginal cuff. After this was  completed, the cuff was closed in a running interlocking fashion with  the 0 Vicryl suture after making certain there was no active bleeding  from any of the pedicles inside the peritoneal cavity. Then, Todd  catheter was placed and the vagina was packed with iodoform gauze. All  sponge, needle and instrument counts were noted to be correct.         Cookie Kwong    D: 04/13/2021 11:53:36       T: 04/13/2021 12:01:25     [email or web address]  Job#: [id number]     Doc#: [id number]    CC:

## 2021-04-14 NOTE — H&P
Hospital Medicine  History and Physical    Patient:  Sun Martinez  MRN: [id number]    CHIEF COMPLAINT:  Low abdominal pain after surgery (S/P vaginal hysterectomy with bilateral salpingectomy)    History Obtained From:  Patient using  #[id number] and #[id number] (after loosing connection with the first ). PCP: Etta Jones DO    HISTORY OF PRESENT ILLNESS:   The patient is a 45 y.o. female who underwent Vaginal hysterectomy with bilateral salpingectomy yesterday secondary to menorrhalgia secondary to endometrial polyp. Dr. Lorna Bynum kindly asked me to follow Alysa Gleason poster operatively. Dr. Lorna Bynum reports no intra or post op complications. Since surgery, Alysa Gleason has been on a PCA pump. She states the pain pump helps with her pain but does not completely eliminate it. I explained that unfortunately, the pain will not be completely eliminated but should be comfortable. She denies any chest pain but occasionally will have some trouble taking a deep breath when her stomach pain is worse. The importance of incentive spirometry was addressed. She had some nausea with an episode of vomiting yesterday after eating but reports the nausea better this am.  Todd cath in place with an order to remove this am.  She denies having a BM since surgery. Vaginal packing ordered to come out this am which Alysa Gleason was informed of. The importance of ambulation to reduce risk for DVT was discussed.   Labs reviewed this am.    Past Medical History:        Diagnosis Date    Herpes     Yeast infection        Past Surgical History:        Procedure Laterality Date    HYSTERECTOMY VAGINAL Bilateral 04/13/2021    TVH with BSO per Dr. Brenna Li, VAGINAL Bilateral 4/13/2021    TOTAL VAGINAL HYSTERECTOMY WITH BILATERAL SALPINAGECTOMY (PATHOLOGY NEEDED) performed by Debra Powell MD at [facility] OR       Medications Prior to Admission:  I obtained, documented, reviewed, and updated the patient's current abnormality. Eye: Normal external eye, conjunctiva, lids cornea, RON. Ears: Normal TM's bilaterally. Normal auditory canals and external ears. Non-tender. Nose: Normal external nose, mucus membranes and septum. Nose: End tidal CO2 monitor on (nasal cannula). Pharynx: Dental hygiene adequate. Normal buccal mucosa. Normal pharynx. Neck: no adenopathy, no carotid bruit and supple, symmetrical, trachea midline  Lungs: clear to auscultation bilaterally  Heart: regular rate and rhythm, S1, S2 normal and II/VI systolic murmur. Abdomen: +BS, soft, non distended. Deep palpation not performed since she is post op. Extremities: extremities normal, atraumatic, no cyanosis or edema and SCD's on. Neurologic: Mental status: Alert, oriented, thought content appropriate    CBC:   Recent Labs     04/12/21  0933 04/13/21  1500 04/14/21  0410   WBC 4.6  --  10.3   HGB 13.0 12.8 11.7*     --  335     BMP:  No results for input(s): NA, K, CL, CO2, BUN, CREATININE, GLUCOSE in the last 72 hours. Hepatic: No results for input(s): AST, ALT, ALB, BILITOT, ALKPHOS in the last 72 hours. Troponin: No results for input(s): TROPONINI in the last 72 hours. BNP: No results for input(s): BNP in the last 72 hours. Lipids: No results for input(s): CHOL, HDL in the last 72 hours. Invalid input(s): LDLCALCU  ABGs: No results found for: PHART, PO2ART, [id number]  INR: No results for input(s): INR in the last 72 hours. -----------------------------------------------------------------      Assessment and Plan   1. S/P Vaginal hysterectomy with bilateral salpingectomy - doing well. Pain controlled with PCA this am.  Will need to transition to oral pain medication. 2.  Mild post op anemia - Hgb 11.7 this am.    3.  H/O Herpes infection - on oral Valtrex. Plan:  1. Remove vaginal packing this am.  2.  Remove campos cath this am and monitoring out put.  3.  Transition to oral pain medication today.   4.  Encourage incentive spirometry Q 1 hour while awake. 5.  Out of bed and ambulating this am.  The importance of ambulation was discussed. 6.  Lovenox / SCD's for DVT prophylaxis. 7.  Consider discharge this afternoon if doing well. Estimated length of stay 1 day. The beneficiary may reasonably be expected to be discharged or transferred to a hospital within 96 hours after admission. Patient Active Problem List   Diagnosis Code    Dysmenorrhea N94.6    Menorrhagia N92.0    Hx of menorrhagia Z87.42       DVT prophylaxis:   [x] Lovenox   [] SCDs   [] SQ Heparin   [] Encourage ambulation, low risk for DVT, no chemical or mechanical    prophylaxis necessary      [] Already on Anticoagulation      Advanced Care Plan    (x)  I confirmed that the patient's Advanced Care Plan is present, code status documented, or surrogate decision maker is listed in the patient's medical record. ( )  The patient's advanced care plan is not present because:  (select)   ( ) I confirmed today that the patient does not wish or was not able to name a surrogate decision maker or provide an [address] ( ) Hospice care is currently being provided or has been provided this calender year. ( )  I did not confirm today the presence of an [address] or surrogate decision maker documented within the patient's medical record. (Does not satisfy MIPS performance). Documentation of Current Medications in the Medical Record    (x)  I have utilized all available immediate resources to obtain, update, or review the patient's current medications. If Yes, Stop Here  ( ) The patient is not eligible for medications reconciliation; the patient is in an emergent medical situation where delaying treatment would jeopardize the patient's health. ( ) I did not confirm, update or review the patient's current list of medications today.   (does not satisfy MIPS performance)      Deyanira Vazquez MD  Admitting Hospitalist

## 2021-04-14 NOTE — PROGRESS NOTES
Quality flow rounds held on 4/14/21     Freddy Quiroz is admitted for  Vaginal hysterectomy    Length of stay 1. Education:    Needed Education: wound care, activity, meds, follow up, diet      Do you have any questions regarding your plan of care while at the hospital? denies    Planned Disposition:               [x]  Home when able                [] Swing Bed                [] ECF/SNF               [] Other/TBD    Barriers to Discharge:    Can you afford your medications? Yes, usually pays cash but  is the only one working d/t her surgery. She voiced concern over possible expense of new discharge meds. LSW to assist with cost of meds. Do you have transportation to follow up appointments? Drives self or  drives   Do you need any new equipment at home? Wound care supplies   Current equipment includes   none    Do you have a living will or durable power of  for healthcare? denies               If yes do we have a copy on file? n/a    Do you or your family have any questions or concerns we haven't already discussed? Denies     iPad used, ID number [id number]. pts  at bedside during rounding. Zohreh Calix and writer present. Pt lives with her , children, niece and niece's child. Plans to return home and denies needs.

## 2021-04-14 NOTE — PLAN OF CARE
Problem: Pain:  Goal: Pain level will decrease  Description: Pain level will decrease  4/14/2021 1113 by Charlotte Chau RN  Outcome: Ongoing  4/14/2021 0550 by Gena Avila RN  Outcome: Ongoing     Problem: Discharge Planning:  Goal: Discharged to appropriate level of care  Description: Discharged to appropriate level of care  Outcome: Ongoing     Problem: Pain - Acute:  Goal: Pain level will decrease  Description: Pain level will decrease  4/14/2021 1113 by Charlotte Chau RN  Outcome: Ongoing  4/14/2021 0550 by Gena Avila RN  Outcome: Ongoing     Problem: Urinary Elimination - Impaired:  Goal: Ability to achieve and maintain adequate urine output will improve to within specified parameters  Description: Ability to achieve and maintain adequate urine output will improve to within specified parameters  4/14/2021 1113 by Charlotte Chau RN  Outcome: Met This Shift  4/14/2021 0550 by Gena Avila RN  Outcome: Met This Shift     Problem: Venous Thromboembolism:  Goal: Will show no signs or symptoms of venous thromboembolism  Description: Will show no signs or symptoms of venous thromboembolism  4/14/2021 0550 by Gena Avila RN  Outcome: Met This Shift  Goal: Absence of signs or symptoms of impaired coagulation  Description: Absence of signs or symptoms of impaired coagulation  4/14/2021 1113 by Charlotte Chau RN  Outcome: Met This Shift  4/14/2021 0550 by Gena Avila RN  Outcome: Met This Shift     Problem:  Activity Intolerance:  Goal: Ability to tolerate increased activity will improve  Description: Ability to tolerate increased activity will improve  4/14/2021 1113 by Charlotte Chau RN  Outcome: Met This Shift  4/14/2021 0550 by Gena Avila RN  Outcome: Ongoing     Problem: Mental Status - Impaired:  Goal: Absence of physical injury  Description: Absence of physical injury  Outcome: Met This Shift     Problem: Mobility - Impaired:  Goal: Mobility will improve to maximum level  Description: Mobility will improve to maximum level  4/14/2021 0550 by Hanny Miramontes RN  Outcome: Ongoing     Problem: Pain:  Goal: Pain level will decrease  Description: Pain level will decrease  4/14/2021 1113 by Gayla Smith RN  Outcome: Ongoing  4/14/2021 0550 by Hanny Miramontes RN  Outcome: Ongoing     Problem: Skin Integrity - Impaired:  Goal: Will show no infection signs and symptoms  Description: Will show no infection signs and symptoms  Outcome: Met This Shift     Problem: Sleep Pattern Disturbance:  Goal: Appears well-rested  Description: Appears well-rested  Outcome: Met This Shift     Problem: Nausea/Vomiting:  Goal: Absence of nausea/vomiting  Description: Absence of nausea/vomiting  4/14/2021 1113 by Gayla Smith RN  Outcome: Ongoing  4/14/2021 0550 by Hanny Miramontes RN  Outcome: Ongoing  Goal: Able to drink  Description: Able to drink  4/14/2021 1113 by Gayla Smith RN  Outcome: Met This Shift  4/14/2021 0550 by Hanny Miramontes RN  Outcome: Met This Shift  Goal: Able to eat  Description: Able to eat  Outcome: Met This Shift     Problem:  Activity:  Goal: Ability to tolerate increased activity will improve  Description: Ability to tolerate increased activity will improve  4/14/2021 1113 by Gayla Smith RN  Outcome: Met This Shift  4/14/2021 0550 by Hanny Miramontes RN  Outcome: Ongoing     Problem: Physical Regulation:  Goal: Will show no signs and symptoms of excessive bleeding  Description: Will show no signs and symptoms of excessive bleeding  Outcome: Met This Shift  Goal: Complications related to the disease process, condition or treatment will be avoided or minimized  Description: Complications related to the disease process, condition or treatment will be avoided or minimized  Outcome: Met This Shift     Problem: Safety:  Goal: Ability to remain free from injury will improve  Description: Ability to remain free from injury will improve  Outcome: Met This Shift     Problem: Sensory:  Goal: Pain level will decrease  Description: Pain level will decrease  4/14/2021 1113 by Jameson Rodriguez, RN  Outcome: Ongoing  4/14/2021 0550 by Jeannie Zimmer RN  Outcome: Ongoing     Problem: Urinary Elimination:  Goal: Complications related to the disease process, condition or treatment will be avoided or minimized  Description: Complications related to the disease process, condition or treatment will be avoided or minimized  Outcome: Met This Shift

## 2021-04-15 ENCOUNTER — TELEPHONE (OUTPATIENT)
Dept: OBGYN CLINIC | Age: 41
End: 2021-04-15

## 2021-04-20 ENCOUNTER — OFFICE VISIT (OUTPATIENT)
Dept: OBGYN CLINIC | Age: 41
End: 2021-04-20

## 2021-04-20 VITALS
HEIGHT: 62 IN | SYSTOLIC BLOOD PRESSURE: 104 MMHG | BODY MASS INDEX: 30.91 KG/M2 | WEIGHT: 168 LBS | DIASTOLIC BLOOD PRESSURE: 68 MMHG

## 2021-04-20 DIAGNOSIS — Z09 POSTOP CHECK: Primary | ICD-10-CM

## 2021-04-20 PROCEDURE — 99024 POSTOP FOLLOW-UP VISIT: CPT | Performed by: OBSTETRICS & GYNECOLOGY

## 2021-04-20 ASSESSMENT — PATIENT HEALTH QUESTIONNAIRE - PHQ9
SUM OF ALL RESPONSES TO PHQ QUESTIONS 1-9: 0
SUM OF ALL RESPONSES TO PHQ9 QUESTIONS 1 & 2: 0
2. FEELING DOWN, DEPRESSED OR HOPELESS: 0
SUM OF ALL RESPONSES TO PHQ QUESTIONS 1-9: 0

## 2021-04-20 NOTE — PROGRESS NOTES
PE:  Vital Signs  Blood pressure 104/68, height 5' 2\" (1.575 m), weight 168 lb (76.2 kg), last menstrual period 02/17/2021, not currently breastfeeding. Labs:    No results found for this visit on 04/20/21. NURSE: Ashely    HPI: Patient is here for a week postoperative visit. She denies having any fever any unusual pain nausea vomiting. She is now using Tylenol and/or Motrin    No  PT denies fever, chills, nausea and vomiting       Objective: I did review pathology results with the patient and her . Assessment and Plan: With the aid of  I thoroughly went over postop concerns pathology results and follow-up visit          Diagnosis Orders   1. Postop check               Return in about 6 weeks (around 6/1/2021). FF: 20 minutes    There are no Patient Instructions on file for this visit. Over 75%of time spent on counseling and care coordination on: see assessment and plan,  She was also counseled on her preventative health maintenance recommendations and follow-up.       Domenico Ren,4/20/2021 4:40 PM

## 2021-05-25 ENCOUNTER — OFFICE VISIT (OUTPATIENT)
Dept: OBGYN CLINIC | Age: 41
End: 2021-05-25

## 2021-05-25 VITALS
HEIGHT: 62 IN | SYSTOLIC BLOOD PRESSURE: 108 MMHG | WEIGHT: 173.8 LBS | DIASTOLIC BLOOD PRESSURE: 64 MMHG | BODY MASS INDEX: 31.98 KG/M2

## 2021-05-25 DIAGNOSIS — G89.18 POSTOPERATIVE PAIN: Primary | ICD-10-CM

## 2021-05-25 PROCEDURE — 99024 POSTOP FOLLOW-UP VISIT: CPT | Performed by: OBSTETRICS & GYNECOLOGY

## 2021-05-25 RX ORDER — DOXYCYCLINE HYCLATE 100 MG
100 TABLET ORAL 2 TIMES DAILY
Qty: 20 TABLET | Refills: 0 | Status: SHIPPED | OUTPATIENT
Start: 2021-05-25 | End: 2021-06-04

## 2021-05-25 NOTE — PROGRESS NOTES
PROBLEM VISIT     Date of service: 2021    Rocio Farrar  Is a 45 y.o. single female    PT's PCP is: Lorna Ngo DO     : 1980                                             Subjective:       Patient's last menstrual period was 2021 (approximate). OB History    Para Term  AB Living   5 5 5 0 0 0   SAB TAB Ectopic Molar Multiple Live Births   0 0 0   0        # Outcome Date GA Lbr Shiv/2nd Weight Sex Delivery Anes PTL Lv   5 Term            4 Term            3 Term            2 Term            1 Term                 Social History     Tobacco Use   Smoking Status Never Smoker   Smokeless Tobacco Never Used        Social History     Substance and Sexual Activity   Alcohol Use No    Alcohol/week: 0.0 standard drinks       Allergies: Patient has no known allergies. Current Outpatient Medications:     diclofenac (VOLTAREN) 50 MG EC tablet, Take 1 tablet by mouth 2 times daily Do NOT take until prednisone is done., Disp: 60 tablet, Rfl: 2    valACYclovir (VALTREX) 500 MG tablet, Take 1 tablet by mouth daily, Disp: 30 tablet, Rfl: 12    acetaminophen (TYLENOL) 325 MG tablet, Take 325 mg by mouth every 6 hours as needed. , Disp: , Rfl:     Social History     Substance and Sexual Activity   Sexual Activity Yes    Partners: Male       Last Yearly:  20    Last pap: 20    Last HPV: 16    Chief Complaint   Patient presents with   Avila Roberts     pt presents for post op check following TVH on  - pt is still having a little bit of pain she is taking motrin and tylenol          NURSE: cullen      PE:  Vital Signs  Blood pressure 108/64, height 5' 2\" (1.575 m), weight 173 lb 12.8 oz (78.8 kg), last menstrual period 2021, not currently breastfeeding. Labs:    No results found for this visit on 21. NURSE: aretha    HPI: The patient is here 5 weeks after her vaginal hysterectomy and bilateral salpingectomy.   The surgery itself was uncomplicated and the patient was discharged home the following day. At this time she is ambulatory and resuming all of her activities. Her complaints are generalized pain states when she is working with her arm she has pain in the right upper quadrant radiating to the right midabdomen. No  PT denies fever, chills, nausea and vomiting       Objective: Abdomen is soft no guarding or rebound. The cuff is well-healed without any signs of granulation or infection. Patient does complain of tenderness over the cuff there is no cuff induration                           Assessment and Plan: There is no sign of any acute infection or any significant postoperative problem other than some persistent pain. The patient is using Motrin and Tylenol and I did ask her to continue using it. I am going to add doxycycline in the event that there could be a minor infection that I cannot appreciate on the physical exam.          Diagnosis Orders   1. Postoperative pain  doxycycline hyclate (VIBRA-TABS) 100 MG tablet             No follow-ups on file. FF: 10 minutes    There are no Patient Instructions on file for this visit. Over 75%of time spent on counseling and care coordination on: see assessment and plan,  She was also counseled on her preventative health maintenance recommendations and follow-up.       Chinmay Velasquez MD,5/25/2021 4:17 PM

## 2021-09-21 ENCOUNTER — OFFICE VISIT (OUTPATIENT)
Dept: OBGYN CLINIC | Age: 41
End: 2021-09-21
Payer: MEDICAID

## 2021-09-21 VITALS
BODY MASS INDEX: 32.2 KG/M2 | HEIGHT: 62 IN | WEIGHT: 175 LBS | SYSTOLIC BLOOD PRESSURE: 112 MMHG | DIASTOLIC BLOOD PRESSURE: 64 MMHG

## 2021-09-21 DIAGNOSIS — R10.31 COLICKY RIGHT LOWER QUADRANT PAIN: Primary | ICD-10-CM

## 2021-09-21 PROCEDURE — 99213 OFFICE O/P EST LOW 20 MIN: CPT | Performed by: OBSTETRICS & GYNECOLOGY

## 2021-09-21 PROCEDURE — 81003 URINALYSIS AUTO W/O SCOPE: CPT | Performed by: OBSTETRICS & GYNECOLOGY

## 2021-09-21 NOTE — PROGRESS NOTES
PROBLEM VISIT     Date of service: 2021    Olena Galarza  Is a 45 y.o. single female    PT's PCP is: Dejuan Baumann DO     : 1980                                             Subjective:       Patient's last menstrual period was 2021 (approximate). OB History    Para Term  AB Living   5 5 5 0 0 0   SAB TAB Ectopic Molar Multiple Live Births   0 0 0   0        # Outcome Date GA Lbr Shiv/2nd Weight Sex Delivery Anes PTL Lv   5 Term            4 Term            3 Term            2 Term            1 Term                 Social History     Tobacco Use   Smoking Status Never Smoker   Smokeless Tobacco Never Used        Social History     Substance and Sexual Activity   Alcohol Use No    Alcohol/week: 0.0 standard drinks       Allergies: Patient has no known allergies. Current Outpatient Medications:     valACYclovir (VALTREX) 500 MG tablet, Take 1 tablet by mouth daily, Disp: 30 tablet, Rfl: 12    acetaminophen (TYLENOL) 325 MG tablet, Take 325 mg by mouth every 6 hours as needed. , Disp: , Rfl:     diclofenac (VOLTAREN) 50 MG EC tablet, Take 1 tablet by mouth 2 times daily Do NOT take until prednisone is done., Disp: 60 tablet, Rfl: 2    Social History     Substance and Sexual Activity   Sexual Activity Yes    Partners: Male       Last Yearly:  20    Last pap: 20    Last HPV: 16    Chief Complaint   Patient presents with    Vaginal Pain     Pt states shes been having uncomfortable vaginal pain for 3days with any kind of moment. States it is hard to walk or move around. Right now pain is ok. Also some lower abdominal pain. NURSE: ZACARIAS    PE:  Vital Signs  Blood pressure 112/64, height 5' 2\" (1.575 m), weight 175 lb (79.4 kg), last menstrual period 2021, not currently breastfeeding. Labs:    No results found for this visit on 21. HPI: The patient is here to discuss persistent colicky pain she has in the right lower quadrant. She describes the pain will go away and then she will be up busy working with her arms and and the pain will resume. She does not have the pain during sexual relations but afterwards she will get the same colicky pain. She denies having any urinary problems or constipation or diarrhea. She has no fever. Of significance she had total vaginal hysterectomy with bilateral salpingectomy in April of this year. Ovaries were spared    No PT denies fever, chills, nausea and vomiting       Objective  Lymphatic:   no lymphadenopathy  Heent:   negative   Cor: regular rate and rhythm, no murmurs              Pul:clear to auscultation bilaterally- no wheezes, rales or rhonchi, normal air movement, no respiratory distress      GI: Most tender area is in the right inguinal area and the right lower quadrant just superior to this. There is no guarding or rebound tenderness           Extremities: normal strength, tone, and muscle mass   Breasts: Breast: Not examined   Pelvic Exam: GENITAL/URINARY:  External Genitalia:  General appearance; normal, Hair distribution; normal, Lesions absent  Vagina:  General appearance normal, Estrogen effect normal, Discharge absent, Lesions absent, Pelvic support normal  Uterus:  Hystory of hysterectomy  Adenexa:  Hystory of ovary removal                                    Vaginal discharge: no vaginal discharge                          Assessment and Plan: I can find no obvious etiology of the cause of her colicky intermittent right lower quadrant pain. For this reason I am going order lab testing and CT scan of abdomen and pelvis. Of note this interview went through the  service          Diagnosis Orders   1.  Colicky right lower quadrant pain  CT ABDOMEN PELVIS W IV CONTRAST Additional Contrast? Radiologist Recommendation    Urinalysis Reflex to Culture    CBC Auto Differential    Comprehensive Metabolic Panel             I am having Emry Dawley maintain her acetaminophen, valACYclovir, and diclofenac. No follow-ups on file. There are no Patient Instructions on file for this visit. Over 50% of time spent on counseling and care coordination on: see assessment and plan,  She was also counseled on her preventative health maintenance recommendations and follow-up.         FF time: 25 min      Lupillo Hdz MD,9/21/2021 1:49 PM

## 2021-09-29 ENCOUNTER — HOSPITAL ENCOUNTER (OUTPATIENT)
Dept: CT IMAGING | Age: 41
Discharge: HOME | End: 2021-10-01

## 2021-09-29 DIAGNOSIS — R10.31 COLICKY RIGHT LOWER QUADRANT PAIN: ICD-10-CM

## 2021-09-29 PROCEDURE — 6360000004 HC RX CONTRAST MEDICATION: Performed by: OBSTETRICS & GYNECOLOGY

## 2021-09-29 PROCEDURE — 74177 CT ABD & PELVIS W/CONTRAST: CPT

## 2021-09-29 RX ADMIN — IOPAMIDOL 75 ML: 755 INJECTION, SOLUTION INTRAVENOUS at 09:47

## 2022-02-21 ENCOUNTER — HOSPITAL ENCOUNTER (OUTPATIENT)
Age: 42
Setting detail: SPECIMEN
Discharge: HOME | End: 2022-02-21

## 2022-02-21 ENCOUNTER — OFFICE VISIT (OUTPATIENT)
Dept: PRIMARY CARE CLINIC | Age: 42
End: 2022-02-21

## 2022-02-21 VITALS
OXYGEN SATURATION: 98 % | BODY MASS INDEX: 30.49 KG/M2 | HEIGHT: 63 IN | DIASTOLIC BLOOD PRESSURE: 74 MMHG | SYSTOLIC BLOOD PRESSURE: 114 MMHG | WEIGHT: 172.1 LBS | TEMPERATURE: 98.6 F | HEART RATE: 60 BPM | RESPIRATION RATE: 18 BRPM

## 2022-02-21 DIAGNOSIS — N30.01 ACUTE CYSTITIS WITH HEMATURIA: Primary | ICD-10-CM

## 2022-02-21 DIAGNOSIS — K59.00 CONSTIPATION, UNSPECIFIED CONSTIPATION TYPE: ICD-10-CM

## 2022-02-21 DIAGNOSIS — N30.01 ACUTE CYSTITIS WITH HEMATURIA: ICD-10-CM

## 2022-02-21 LAB
BILIRUBIN, POC: 0
BLOOD URINE, POC: ABNORMAL
CLARITY, POC: ABNORMAL
COLOR, POC: YELLOW
GLUCOSE URINE, POC: NEGATIVE
KETONES, POC: POSITIVE
LEUKOCYTE EST, POC: NEGATIVE
NITRITE, POC: NEGATIVE
PH, POC: 5
PROTEIN, POC: NEGATIVE
SPECIFIC GRAVITY, POC: 1.02
UROBILINOGEN, POC: NORMAL

## 2022-02-21 PROCEDURE — 87086 URINE CULTURE/COLONY COUNT: CPT

## 2022-02-21 PROCEDURE — 99213 OFFICE O/P EST LOW 20 MIN: CPT | Performed by: NURSE PRACTITIONER

## 2022-02-21 PROCEDURE — 81002 URINALYSIS NONAUTO W/O SCOPE: CPT | Performed by: NURSE PRACTITIONER

## 2022-02-21 RX ORDER — NITROFURANTOIN 25; 75 MG/1; MG/1
100 CAPSULE ORAL 2 TIMES DAILY
Qty: 10 CAPSULE | Refills: 0 | Status: SHIPPED | OUTPATIENT
Start: 2022-02-21 | End: 2022-02-26

## 2022-02-21 RX ORDER — POLYETHYLENE GLYCOL 3350 17 G/17G
17 POWDER, FOR SOLUTION ORAL DAILY
Qty: 1530 G | Refills: 0 | Status: SHIPPED | OUTPATIENT
Start: 2022-02-21 | End: 2022-03-23

## 2022-02-21 SDOH — ECONOMIC STABILITY: FOOD INSECURITY: WITHIN THE PAST 12 MONTHS, THE FOOD YOU BOUGHT JUST DIDN'T LAST AND YOU DIDN'T HAVE MONEY TO GET MORE.: NEVER TRUE

## 2022-02-21 SDOH — ECONOMIC STABILITY: FOOD INSECURITY: WITHIN THE PAST 12 MONTHS, YOU WORRIED THAT YOUR FOOD WOULD RUN OUT BEFORE YOU GOT MONEY TO BUY MORE.: NEVER TRUE

## 2022-02-21 ASSESSMENT — ENCOUNTER SYMPTOMS
SHORTNESS OF BREATH: 0
TROUBLE SWALLOWING: 0
BACK PAIN: 1
WHEEZING: 0
RHINORRHEA: 0
NAUSEA: 1
SORE THROAT: 0
COUGH: 0
ABDOMINAL PAIN: 1
DIARRHEA: 0
VOMITING: 1
BLOOD IN STOOL: 0

## 2022-02-21 ASSESSMENT — SOCIAL DETERMINANTS OF HEALTH (SDOH): HOW HARD IS IT FOR YOU TO PAY FOR THE VERY BASICS LIKE FOOD, HOUSING, MEDICAL CARE, AND HEATING?: NOT HARD AT ALL

## 2022-02-21 NOTE — PATIENT INSTRUCTIONS
Patient Education        Cayden Nuno en las mujeres: Instrucciones de cuidado  Urinary Tract Infection in Women: Care Instructions  Instrucciones de cuidado    Nai infección urinaria (UTI, por bipin siglas en inglés) es un término general que hace referencia a nai infección que se produce en cualquier parte entre los riñones y la uretra (conducto por el cual se expulsa la orina). La mayoría de las UTI son infecciones de la vejiga. Con frecuencia, causan dolor o ardor al HarlanMadeline. Las UTI son causadas por bacterias y pueden curarse con antibióticos. Asegúrese de completar el tratamiento para que la infección desaparezca. La atención de seguimiento es nai parte clave de lyon tratamiento y seguridad. Asegúrese de hacer y acudir a todas las citas, y llame a lyon médico si está teniendo problemas. También es nai buena idea saber los resultados de bipin exámenes y mantener nai lista de los medicamentos que ho. ¿Cómo puede cuidarse en el hogar? · [address] No deje de tomarlos por el hecho de sentirse mejor. Debe jules todos los antibióticos hasta terminarlos. · Karmen los próximos zoë o [address], mari mayor cantidad de [place] y otros líquidos. [place] puede ayudar a eliminar las bacterias que provocan la infección. (Si tiene nai enfermedad de los riñones, el corazón o el hígado y tiene que Grayson's líquidos, hable con lyon médico antes de aumentar lyon consumo). · Evite las bebidas gaseosas o con cafeína. Pueden irritar la vejiga. · Orine con frecuencia. Trate de vaciar la vejiga cada vez que orine. · Para aliviar el dolor, tome un baño caliente o colóquese nai almohadilla térmica a baja temperatura sobre la parte baja del abdomen o la ellie genital. Nunca se duerma mientras [place] nai almohadilla térmica. Para prevenir las infecciones urinarias  · Mari abundante agua todos los días. [place] la ayuda a orinar con frecuencia, lo que elimina las bacterias de lyon organismo.  (Si tiene [employer] enfermedad de los riñones, el corazón o el hígado y tiene que Grayson's líquidos, hable con lyon médico antes de aumentar lyon consumo). · Orine cuando necesite hacerlo. · Orine inmediatamente después de yinka tenido [employer]. · Cámbiese las toallas sanitarias con frecuencia. · Evite el uso de lavados vaginales, los vincent de burbujas, los Räterschen de higiene femenina y otros productos para la higiene femenina que contengan desodorantes. · Después de ir al baño, límpiese de adelante hacia atrás. ¿Cuándo debes pedir ayuda? Llama a tu médico ahora mismo o busca atención médica inmediata si:    · Aparecen síntomas rubin fiebre, escalofríos, náuseas o vómitos por primera vez, o empeoran.     · Te empieza a doler la espalda, johana debajo de la caja torácica. A esto se le llama dolor en el flanco.     · Aparece reginald o pus en la orina.     · Tienes problemas con los antibióticos. Presta especial atención a los cambios en tu gerson y asegúrate de comunicarte con tu médico si:    · No mejoras después de yinka tomado un antibiótico jono 2 días.     · Los síntomas desaparecen y Leocadia Sylvie. ¿Dónde puede encontrar más información en inglés? Kushal Adam a [email or web address] e ingrese a lyon cuenta de Risa HEARN en el Dianna Richter \"Search Health Information\" para más información (en inglés) sobre \"Infección urinaria en las mujeres: Instrucciones de cuidado. \"     Si no tiene nai cuenta, loraine clic en el enlace \"Sign Up Now\". Revisado: 10 febrero, 2021               Versión del contenido: 13.1  © 1344-2415 Healthwise, [employer]. Las instrucciones de cuidado fueron adaptadas bajo licencia por [facility] ([facility]). Si usted tiene [facility] afección médica o sobre estas instrucciones, siempre pregunte a lyon profesional de gerson. LS9, [employer] niega toda garantía o responsabilidad por lyon uso de esta información.        Patient Education        nitrofurantoin  Pronunciation: VIKTOR TEJEDA toin  Brand:  Macrobid, Macrodantin  What is the most important information I should know about nitrofurantoin? You should not take nitrofurantoin if you have severe kidney disease, urination problems, or a history of jaundice or liver problems caused by nitrofurantoin. Do not take nitrofurantoin during late pregnancy (from 38 weeks through delivery). What is nitrofurantoin? Nitrofurantoin is an antibiotic that is used to treat urinary tract infections caused by bacteria. Nitrofurantoin may also be used for purposes not listed in this medication guide. What should I discuss with my healthcare provider before taking nitrofurantoin? You should not take nitrofurantoin if you are allergic to it, or if you have:  · severe kidney disease;  · urination problems (little or no urination); or  · a history of jaundice or liver problems caused by taking nitrofurantoin. Do not take nitrofurantoin during late pregnancy (from 38 weeks through delivery). Tell your doctor if you have ever had:  · kidney disease;  · anemia;  · diabetes;  · an electrolyte imbalance or vitamin B deficiency;  · glucose-6-phosphate dehydrogenase (G6PD) deficiency; or  · any type of debilitating disease. You should not breastfeed a baby younger than 2 month old while you are taking nitrofurantoin. Nitrofurantoin should not be given to a child younger than 2 month old. How should I take nitrofurantoin? Follow all directions on your prescription label and read all medication guides or instruction sheets. Use the medicine exactly as directed. Take nitrofurantoin with food, even if you take it at bedtime. Shake the oral suspension (liquid) before you measure a dose. Use the dosing syringe provided, or use a medicine dose-measuring device (not a kitchen spoon). You may need to keep taking nitrofurantoin for up to 7 days after lab tests show that the infection has cleared. Follow your doctor's instructions.   Use this medicine for the full prescribed length of time, even if your symptoms quickly improve. Skipping doses can increase your risk of infection that is resistant to medication. Nitrofurantoin will not treat a viral infection such as the flu or a common cold. This medicine can affect the results of certain medical tests. Tell any doctor who treats you that you are using nitrofurantoin. If you use this medicine long-term, you may need frequent medical tests. Store at room temperature away from moisture, heat, and light. Do not freeze the liquid medicine, and keep the bottle tightly closed when not in use. Throw away any nitrofurantoin liquid that has not been used within 30 days. What happens if I miss a dose? Take the medicine as soon as you can, but skip the missed dose if it is almost time for your next dose. Do not take two doses at one time. What happens if I overdose? Seek emergency medical attention or call the Poison Help line at 1-922.453.6154. Overdose can cause vomiting. What should I avoid while taking nitrofurantoin? Antibiotic medicines can cause diarrhea, which may be a sign of a new infection. If you have diarrhea that is watery or bloody, call your doctor before using anti-diarrhea medicine. Avoid taking an antacid that contains magnesium trisilicate, which could make it harder for your body to absorb nitrofurantoin. What are the possible side effects of nitrofurantoin? Get emergency medical help if you have signs of an allergic reaction (hives, difficult breathing, swelling in your face or throat) or a severe skin reaction (fever, sore throat, burning eyes, skin pain, red or purple skin rash with blistering and peeling).   Call your doctor at once if you have:  · severe stomach pain, diarrhea that is watery or bloody (even if it occurs months after your last dose);  · vision problems;  · fever, chills, cough, chest pain, trouble breathing;  · numbness, tingling, or burning pain in your hands or [facility] drug information does not endorse drugs, diagnose patients or recommend therapy. [facility]'s drug information is an informational resource designed to assist licensed healthcare practitioners in caring for their patients and/or to serve consumers viewing this service as a supplement to, and not a substitute for, the expertise, skill, knowledge and judgment of healthcare practitioners. The absence of a warning for a given drug or drug combination in no way should be construed to indicate that the drug or drug combination is safe, effective or appropriate for any given patient. [facility] does not assume any responsibility for any aspect of healthcare administered with the aid of information [facility] provides. The information contained herein is not intended to cover all possible uses, directions, precautions, warnings, drug interactions, allergic reactions, or adverse effects. If you have questions about the drugs you are taking, check with your doctor, nurse or pharmacist.  Copyright 1746-8789 [address]: 9.02. Revision date: 2/24/2020. Care instructions adapted under license by [facility] ([facility]). If you have questions about a medical condition or this instruction, always ask your healthcare professional. [place] [address] any warranty or liability for your use of this information. Patient Education        Estreñimiento: Instrucciones de cuidado  Constipation: Care Instructions  Instrucciones de cuidado     Tener estreñimiento significa que usted tiene dificultades para eliminar las heces (evacuaciones del intestino). Las personas eliminan heces entre 3 veces al día y Odalys Held vez cada 3 delfino. Lo que es normal para usted puede ser [employer]. El estreñimiento puede ocurrir con dolor en el recto y cólicos. El dolor podría empeorar cuando trata de eliminar las heces. A veces hay pequeñas cantidades de reginald brandon viva en el papel higiénico o en la superficie de las heces.  [place] se debe a las venas dilatadas cerca del recto (hemorroides). Algunos cambios en lyon Sandralee Nemesio y estilo de william podrían ayudarle a evitar el estreñimiento continuo. Es posible que el médico además le recete medicamentos para ayudar a aflojar las heces. Algunos medicamentos pueden causar estreñimiento. [place] incluye los analgésicos (medicamentos para el dolor) y los antidepresivos. Infórmele a lyon médico sobre Bessy Flores que usted ho. Es posible que lyon médico quiera cambiar un medicamento para aliviar bipin síntomas. La atención de seguimiento es nai parte clave de lyon tratamiento y seguridad. Asegúrese de hacer y acudir a todas las citas, y llame a lyon médico si está teniendo problemas. También es nai buena idea saber los resultados de bipin exámenes y mantener nai lista de los medicamentos que ho. ¿Cómo puede cuidarse en el hogar? · Mari mucho líquido. Si tiene nai enfermedad renal, cardíaca o hepática y tiene que restringir los líquidos, hable con lyon médico antes de aumentar la cantidad de líquido que jesse. · Incluya en lyon alimentación diaria alimentos ricos en fibra. Estos incluyen frutas, verduras, frijoles (habichuelas) y granos integrales. · Gabriel por lo menos 30 minutos de ejercicio la mayoría de los días de la [place]. Caminar es nai buena opción. Es posible que también quiera hacer otras actividades, rubin correr, nadar, [employer], o jugar al tenis u otros deportes de equipo. · Nathalie un suplemento de [place], rubin Citrucel o Metamucil, todos los GRASSE. Radha y siga todas las indicaciones de la [place]. · Programe tiempo todos los días para evacuar el intestino. Zoe Dibbles rutina diaria podría ayudar. Tómese lyon tiempo para evacuar el intestino. · Apoye los pies sobre un banco o taburete pequeño cuando se siente en el inodoro. [place] ayuda a flexionar las caderas y coloca la pelvis en posición de cuclillas. · [place] médico podría recomendarle un laxante de venta roseanne para aliviar el estreñimiento.  Bright Katz son Milk of Magnesia y Bendena. Radha y siga todas las instrucciones de la [place]. No use laxantes de [employer]. ¿Cuándo debe pedir ayuda? Llame a lyon médico ahora mismo o busque atención médica inmediata si:    · Tiene dolor abdominal nuevo o peor.     · Tiene náuseas o vómito nuevos o peores.     · Tiene reginald en las heces. Preste especial atención a los cambios en lyon gerson y asegúrese de comunicarse con lyon médico si:    · [place] estreñimiento empeora.     · No mejora rubin se esperaba. ¿Dónde puede encontrar más información en inglés? Zach Don a [email or web address] e ingrese a lyon cuenta de MyChart. Yumiko De La Cruz P343 en el [place] [facility] \"Search Health Information\" para más información (en inglés) sobre \"Estreñimiento: Instrucciones de cuidado. \"     Si no tiene nai cuenta, loraine shade en el enlace \"Sign Up Now\". Revisado: 1 julio, 2021               Versión del contenido: 13.1  © 2284-3597 Healthwise, Incorporated. Las instrucciones de cuidado fueron adaptadas bajo licencia por [facility] ([facility]). Si usted tiene [place] afección médica o sobre estas instrucciones, siempre pregunte a lyon profesional de gerson. Healthwise, Incorporated niega toda garantía o responsabilidad por lyon uso de esta información. Patient Education        polyethylene glycol 3350  [place]:  ClearLax, GaviLAX, Gialax, GlycoLax, MiraLax, [id number], SunMark ClearLax  ¿Cuál es la información más importante que valerio saber sobre polyethylene glycol 3350? Usted no debe usar esta medicina si tiene nai obstrucción en el intestino o bloqueo intestinal. Si tiene alguna de estas condiciones, usted podría tener efectos secundarios graves o que pongan lyon william en peligro a consecuencia del polyethylene glycol 3350. No use polyethylene glycol 3350 más de nai vez al día. Hable con lyon médico si sigue con estreñimiento o irregular después de usar [employer] por 7 días seguidos. ¿Qué es glycol 3350?   Polyethylene glycol 3350 es nai solución laxante que aumenta la cantidad de agua en el tracto intestinal para estimular el movimiento intestinal.  Polyethylene glycol 3350 se [place] rubin laxante en el tratamiento del estreñimiento ocasional o movimiento intestinal irregular. Polyethylene glycol 3350 puede también usarse para fines no mencionados en esta guía del medicamento. ¿Qué debería discutir con el profesional del cuidado de la gerson antes de jules polyethylene glycol 3350? Usted no debe usar esta medicina si tiene alergia a polyethylene glycol, o si tiene nai obstrucción en el intestino o bloqueo intestinal. Si tiene alguna de estas condiciones, usted podría tener efectos secundarios graves o que pongan lyon william en peligro a consecuencia del polyethylene glycol 3350. Las personas con problemas del comer (rubni anorexia o bulimia) no deben usar esta medicina sin la aprobación del médico.  Para asegurarse que esta medicina es seguro para usted, dígale a lyon médico si usted tiene:  · náusea, vómito, o dolor intenso del estómago;  · colitis ulcerativa;  · síndrome del intestino irritable;  · enfermedad del riñón; o  · si ha tenido alguna vez un cambio súbito de lyon actividad intestinal que ha durado 2 semanas o más. Categoría C del embarazo por la FDA. No se conoce si polyethylene glycol 3350 causará daño al bebé jourdan. Dígale a lyon médico si usted está embarazada o planea quedar embarazada mientras está usando mariam medicamento. No se sabe si polyethylene glycol 3350 pasa a la [employer] o si le puede hacer daño al bebé lactante. Dígale a lyon médico si está dando de amamantar a un bebé. ¿Cómo valerio usar polyethylene glycol 3350? Siga todas las instrucciones en la etiqueta de lyon prescripción. No use esta medicina en cantidades [employer], o por más tiempo de lo recomendado. Para el uso de la formulación en polvo de esta [place] lyon dosis con la tapa de la botella. Esta tapa tiene marcas para medir la dosis dentro de akira.  Coloque el polvo en 4 u 8 onzas de [place] bebida fría o caliente, rubin agua, jugo, soda, café, o te. Mezcle y tómela de inmediato. No la guarde para más tarde. Polyethylene glycol 3350 debe producir un movimiento intestinal dentro de 1 a 3 días de empezar a usar la medicina. Polyethylene glycol 3350 normalmente causa heces fecales sueltas o incluso acuosas. No use polyethylene glycol 3350 más de nai vez al día. Hable con lyon médico si sigue estreñido o irregular después de usar la medicina por 7 días seguidos. Guarde a temperatura ambiente lejos de la humedad y el calor. ¿Qué sucede si me yolanda nai dosis? Drasco la dosis que dejó de jules tan pronto se acuerde. Sáltese la dosis que dejó de jules si ya jocelyn es hora para la siguiente dosis. No use más medicina para alcanzar la dosis que dejó de jules. Bipin Longest en nai sobredosis? Busque atención médica de emergencia o llame a la línea de Poison Help al [phone].  ¿Qué valerio evitar mientras uso polyethylene glycol 3350? Siga las instrucciones de lyon médico acerca de cualquier restricción de comidas, bebidas, o actividades. ¿Cuáles son los efectos secundarios posibles de polyethylene glycol 3350? Busque atención médica de emergencia si usted tiene síntomas de [place] reacción alérgica: ronchas; dificultad para respirar; hinchazón de la jack, labios, lengua, o garganta. Deje de usar [employer] y llame a lyon médico de inmediato si usted tiene:  · diarrea severa o con reginald;  · sangrado rectal;  · reginald en las heces fecales; o  · dolor del estómago severo o que AFSANEH. Efectos secundarios comunes pueden incluir:  · distensión, gas, malestar estomacal;  · mareo; o  · aumento del sudor. Esta lista no menciona todos los efectos secundarios y puede ser que ocurran otros. Llame a lyon médico para consejos médicos relacionados a efectos secundarios. ted puede reportar efectos secundarios llamando al FDA al 1-800-FDA-7405.   ¿Qué otras drogas afectarán a polyethylene glycol 3350? Otras drogas pueden interactuar con polyethylene glycol 3350, incluyendo medicinas que se obtienen con o sin receta, vitaminas, y productos herbarios. Dígale a cada zoë de bipin proveedores de gerson acerca de todas las medicinas que usted esté [place], y cualquier medicina que usted comience o deje de usar. ¿Dónde puedo obtener más información? Alfaro farmacéutico le puede janice más información acerca de polyethylene glycol 3350. Recuerde, [place] y todas las otras medicinas fuera del alcance de los niños, no comparta nunca bipin medicinas con otros, y use [employer] sólo para la condición por la que fue recetada. Se parish hecho todo lo posible para que la información que proviene de [place] sea precisa, actual, y Giorgio Wheat no se hace garantía de villa. La información sobre el medicamento incluida aquí puede tener nuevas recomendaciones. La información preparada por [employer] se ha creado para uso del profesional de la gerson y para el consumidor en los Estados [place] de [facility][1]a (EE.UU.) y por lo cual Multum no certifica que el uso fuera de los EE.[place]. sea apropiado, a menos que se mencione específicamente lo cual. La información de [employer] sobre drogas no sanciona drogas, ni diagnóstica al paciente o recomienda terapia. La información de [employer] sobre drogas sirve rubin nai coleen de información diseñada para la ayuda del profesional de la gerson licenciado en el cuidado de bipin pacientes y/o para servir al consumidor que reciba mariam servicio rubin un suplemento a, y no rubin sustituto de la competencia, experiencia, conocimiento y opinión del profesional de la gerson. La ausencia en éste de nai advertencia para nai droga o combinación de drogas no debe, de ninguna forma, interpretarse rubin que la droga o la combinación de drogas chance seguras, [place], o apropiadas para cualquier paciente.  [employer] no se responsabiliza por ningún aspecto del cuidado médico que reciba con la ayuda de la información que proviene de Katerin sheridan. La información incluida aquí no se ha creado con la intención de cubrir todos los usos posibles, instrucciones, precauciones, advertencias, interacciones con otras drogas, reacciones alérgicas, o efectos secundarios. Si usted tiene alguna pregunta acerca de las drogas que está tomando, consulte con lyon médico, [place], o farmacéutico.  Copyright 8706-7450 [place] [facility] Version: 2.05. Revision date: 4/5/2017. Las instrucciones de cuidado fueron adaptadas bajo licencia por [facility] ([facility]). Si usted tiene [place] afección médica o sobre estas instrucciones, siempre pregunte a lyon profesional de gerson. [facility]orporated niega toda garantía o responsabilidad por lyon uso de esta información. 1.  Urinary Tract Infection:  · Encouraged to increase fluids and to eat nutritiously. · Avoid full bladder, bubble baths, bath oils and hot tubs. · Wipe front to back and void after sexual intercourse. · Continue antibiotic as prescribed until all doses are completed  · Probiotic OTC or greek yogurt daily while on antibiotic  · Will call with urine culture results once obtained. · Provided urine filter to strain urine for possible kidney stone. · Patient instructions given for urinary tract infection. · To ER or call 911 if any difficulty breathing, shortness of breath, inability to swallow, hives, rash, facial/tongue swelling or temp greater than 103 degrees. · Follow up with PCP or Walk in Care if symptoms worsen or do not improve. 2.  Constipation:  ·  Increase fluids, apple juice, water, gatorade, etc., 6 to 8 glasses per day  · High fiber diet, including whole grains, fruits and vegetables  · Limit high sugar, fatty and starchy foods  · Set regular schedule to go to bathroom to have bowel movement  · Miralaxas as prescribed, if stools become too loose, decrease usage. · Patient given instructions on constipation and miralax.   · Follow up with PCP or Walk in Care if symptoms worsen or do not improve.   · To ER if have increasing pain, unable to eat or drink, blood in the stool, lethargy or temp greater than 101 degrees

## 2022-02-21 NOTE — PROGRESS NOTES
[address] [facility] WALK-IN CARE  [address] [facility] [address]  Dept: [phone]  Dept Fax: [phone]     Isael España is a 37 y.o. female who presents to the [facility] in Care today for hermedical conditions/complaints as noted below. Isael España is c/o of Other (mid abdominal pain, nausea, vomitting, 1 week, pain radiates to mid back and feels like pressure.)      HPI:     Nausea & Vomiting  This is a new problem. The current episode started 1 to 4 weeks ago (Started a week ago with upper mid back pain that radiates around to mid epigastric area with nausea and vomiting. Had CT of abdomen and pelvis done in September 2021 that returned with normal appendix and gallbladder. ). The problem occurs intermittently. The problem has been unchanged. Associated symptoms include abdominal pain (Describes as a pressure.), nausea, urinary symptoms (some discomfort in suprapubic area with urination.) and vomiting. Pertinent negatives include no chills, congestion, coughing, diaphoresis, fatigue, fever, headaches, myalgias, rash or sore throat. Associated symptoms comments: No pain right now, when has pain is between 8 to 10. Has pain every day for last week. Started a job couple of weeks ago and does not think it is related. Pain starts in middle of back and goes to stomach and makes me nauseated and feel like I am going to vomit. Happens before and after eating. Last vomiting 2 days ago. When it starts, I get chilled and my hands get cold. Lasts like 40 minutes. Take Motrin and Advil for pain which helps a little. Last episode of pain around 3 or 4 in the AM.  Denies blood in urine. . The symptoms are aggravated by drinking and eating. She has tried NSAIDs for the symptoms. The treatment provided mild relief.           Past Medical History:   Diagnosis Date    Herpes     Yeast infection         Current Outpatient Medications   Medication Sig Dispense Refill    nitrofurantoin, macrocrystal-monohydrate, (MACROBID) 100 MG capsule Take 1 capsule by mouth 2 times daily for 5 days 10 capsule 0    polyethylene glycol (GLYCOLAX) 17 GM/SCOOP powder Take 17 g by mouth daily 1530 g 0    acetaminophen (TYLENOL) 325 MG tablet Take 325 mg by mouth every 6 hours as needed.  diclofenac (VOLTAREN) 50 MG EC tablet Take 1 tablet by mouth 2 times daily Do NOT take until prednisone is done. 60 tablet 2     No current facility-administered medications for this visit. No Known Allergies    Subjective:     Review of Systems   Constitutional: Positive for appetite change. Negative for chills, diaphoresis, fatigue and fever. HENT: Negative for congestion, ear pain, rhinorrhea, sore throat and trouble swallowing. Respiratory: Negative for cough, shortness of breath and wheezing. Gastrointestinal: Positive for abdominal pain (Describes as a pressure.), nausea and vomiting. Negative for blood in stool and diarrhea. Last BM was yesterday and was very hard. Genitourinary: Positive for dysuria. Negative for flank pain, frequency, hematuria, urgency, vaginal bleeding and vaginal discharge. Musculoskeletal: Positive for back pain. Negative for myalgias. Skin: Negative for rash and wound. Neurological: Negative for dizziness, light-headedness and headaches. Objective:      Physical Exam  Vitals and nursing note reviewed. Constitutional:       General: She is not in acute distress. Appearance: Normal appearance. She is well-developed. She is not ill-appearing or diaphoretic. Comments: Well hydrated, nontoxic appearance.  speaking and understanding only. [facility]  Service used for interpreting, Ladan Ragland, [id number]. HENT:      Head: Normocephalic and atraumatic.       Right Ear: External ear normal.      Left Ear: External ear normal.   Eyes:      Conjunctiva/sclera: Conjunctivae normal. Cardiovascular:      Rate and Rhythm: Normal rate and regular rhythm. Heart sounds: Normal heart sounds, S1 normal and S2 normal. No murmur heard. No friction rub. No gallop. Pulmonary:      Effort: Pulmonary effort is normal. No accessory muscle usage or respiratory distress. Breath sounds: Normal breath sounds and air entry. No decreased breath sounds, wheezing, rhonchi or rales. Comments: No cough. Breath sounds clear B/L anterior and posterior lobes. Chest expansion symmetrical.  No audible wheezing or respiratory distress. No rales or rhonchi. Abdominal:      General: Bowel sounds are normal. There is no distension. Palpations: Abdomen is soft. Tenderness: There is abdominal tenderness in the suprapubic area. There is no right CVA tenderness, left CVA tenderness, guarding or rebound. Musculoskeletal:         General: Normal range of motion. Cervical back: Normal and neck supple. Thoracic back: Tenderness present. No swelling, deformity or signs of trauma. Normal range of motion. Lumbar back: Normal.   Lymphadenopathy:      Cervical: No cervical adenopathy. Right cervical: No superficial or posterior cervical adenopathy. Left cervical: No superficial or posterior cervical adenopathy. Skin:     General: Skin is warm and dry. Coloration: Skin is not pale. Findings: No erythema or rash. Neurological:      Mental Status: She is alert and oriented to person, place, and time. Psychiatric:         Behavior: Behavior normal. Behavior is cooperative. /74 (Site: Left Upper Arm, Position: Sitting, Cuff Size: Medium Adult)   Pulse 60   Temp 98.6 °F (37 °C) (Oral)   Resp 18   Ht 5' 3\" (1.6 m)   Wt 172 lb 1.6 oz (78.1 kg)   LMP 02/17/2021 (Approximate)   SpO2 98%   BMI 30.49 kg/m²     Assessment:      Diagnosis Orders   1.  Acute cystitis with hematuria  nitrofurantoin, macrocrystal-monohydrate, (MACROBID) 100 MG capsule Culture, Urine    POCT Urinalysis no Micro   2. Constipation, unspecified constipation type  polyethylene glycol (GLYCOLAX) 17 GM/SCOOP powder       Plan:      Return if symptoms worsen or fail to improve, for Resume all previous medications as directed. Orders Placed This Encounter   Medications    nitrofurantoin, macrocrystal-monohydrate, (MACROBID) 100 MG capsule     Sig: Take 1 capsule by mouth 2 times daily for 5 days     Dispense:  10 capsule     Refill:  0    polyethylene glycol (GLYCOLAX) 17 GM/SCOOP powder     Sig: Take 17 g by mouth daily     Dispense:  1530 g     Refill:  0      1. Urinary Tract Infection:  · Encouraged to increase fluids and to eat nutritiously. · Avoid full bladder, bubble baths, bath oils and hot tubs. · Wipe front to back and void after sexual intercourse. · Continue antibiotic as prescribed until all doses are completed  · Probiotic OTC or greek yogurt daily while on antibiotic  · Will call with urine culture results once obtained. · Provided urine filter to strain urine for possible kidney stone. Given cup to retain and return to clinic. · Patient instructions given for urinary tract infection. · To ER or call 911 if any difficulty breathing, shortness of breath, inability to swallow, hives, rash, facial/tongue swelling or temp greater than 103 degrees. · Follow up with PCP or Walk in Care if symptoms worsen or do not improve. 2.  Constipation:  ·  Increase fluids, apple juice, water, gatorade, etc., 6 to 8 glasses per day  · High fiber diet, including whole grains, fruits and vegetables  · Limit high sugar, fatty and starchy foods  · Set regular schedule to go to bathroom to have bowel movement  · Miralaxas as prescribed, if stools become too loose, decrease usage. · Patient given instructions on constipation and miralax. · Follow up with PCP or Walk in Care if symptoms worsen or do not improve.   · To ER if have increasing pain, unable to eat or drink, blood in the stool, lethargy or temp greater than 101 degrees     Sienna received counseling on the following healthy behaviors: increased fluids and rest. Patient given educational materials - see patient instructions. Discussed use,benefit, and side effects of prescribed medications. Treatment plan discussed at visit. Continue routine health care follow up. All patient questions answered. Pt voiced understanding.       Electronically signed by BRITTNEY Childs CNP on 2/21/2022 at 1:49 PM

## 2022-02-22 LAB
CULTURE: NORMAL
SPECIMEN DESCRIPTION: NORMAL

## 2022-09-19 ENCOUNTER — OFFICE VISIT (OUTPATIENT)
Dept: FAMILY MEDICINE CLINIC | Age: 42
End: 2022-09-19

## 2022-09-19 VITALS
OXYGEN SATURATION: 98 % | SYSTOLIC BLOOD PRESSURE: 100 MMHG | TEMPERATURE: 98.3 F | DIASTOLIC BLOOD PRESSURE: 62 MMHG | HEART RATE: 68 BPM

## 2022-09-19 DIAGNOSIS — M54.42 CHRONIC BILATERAL LOW BACK PAIN WITH BILATERAL SCIATICA: Primary | ICD-10-CM

## 2022-09-19 DIAGNOSIS — M54.16 LUMBAR RADICULOPATHY: ICD-10-CM

## 2022-09-19 DIAGNOSIS — R10.32 LEFT LOWER QUADRANT ABDOMINAL PAIN: ICD-10-CM

## 2022-09-19 DIAGNOSIS — M54.41 CHRONIC BILATERAL LOW BACK PAIN WITH BILATERAL SCIATICA: Primary | ICD-10-CM

## 2022-09-19 DIAGNOSIS — G89.29 CHRONIC BILATERAL LOW BACK PAIN WITH BILATERAL SCIATICA: Primary | ICD-10-CM

## 2022-09-19 LAB
ALBUMIN SERPL-MCNC: 4.3 G/DL
ALP BLD-CCNC: 78 U/L
ALT SERPL-CCNC: 13 U/L
ANION GAP SERPL CALCULATED.3IONS-SCNC: NORMAL MMOL/L
AST SERPL-CCNC: 12 U/L
BASOPHILS ABSOLUTE: NORMAL
BASOPHILS RELATIVE PERCENT: NORMAL
BILIRUB SERPL-MCNC: 0.6 MG/DL (ref 0.1–1.4)
BILIRUBIN, URINE: NEGATIVE
BLOOD, URINE: NEGATIVE
BUN BLDV-MCNC: 11 MG/DL
CALCIUM SERPL-MCNC: 8.8 MG/DL
CHLORIDE BLD-SCNC: 102 MMOL/L
CLARITY: CLEAR
CO2: 27 MMOL/L
COLOR: YELLOW
CREAT SERPL-MCNC: 0.57 MG/DL
EOSINOPHILS ABSOLUTE: 0.05 /ΜL
EOSINOPHILS RELATIVE PERCENT: 1 %
GFR CALCULATED: >60
GLUCOSE BLD-MCNC: 104 MG/DL
GLUCOSE URINE: NORMAL
HCT VFR BLD CALC: 40.2 % (ref 36–46)
HEMOGLOBIN: 13.3 G/DL (ref 12–16)
KETONES, URINE: NEGATIVE
LEUKOCYTE ESTERASE, URINE: NEGATIVE
LYMPHOCYTES ABSOLUTE: 2.54 /ΜL
LYMPHOCYTES RELATIVE PERCENT: 47 %
MCH RBC QN AUTO: NORMAL PG
MCHC RBC AUTO-ENTMCNC: NORMAL G/DL
MCV RBC AUTO: NORMAL FL
MONOCYTES ABSOLUTE: 0.27 /ΜL
MONOCYTES RELATIVE PERCENT: 5 %
NEUTROPHILS ABSOLUTE: 2.54 /ΜL
NEUTROPHILS RELATIVE PERCENT: 2.54 %
NITRITE, URINE: NEGATIVE
PH UA: 7 (ref 4.5–8)
PLATELET # BLD: 344 K/ΜL
PMV BLD AUTO: NORMAL FL
POTASSIUM SERPL-SCNC: 4.3 MMOL/L
PROTEIN UA: NEGATIVE
RBC # BLD: NORMAL 10*6/UL
SODIUM BLD-SCNC: 136 MMOL/L
SPECIFIC GRAVITY, URINE: 1
TOTAL PROTEIN: 7.3
TSH SERPL DL<=0.05 MIU/L-ACNC: 1.49 UIU/ML
UROBILINOGEN, URINE: NORMAL
WBC # BLD: 5.4 10^3/ML

## 2022-09-19 PROCEDURE — 99213 OFFICE O/P EST LOW 20 MIN: CPT | Performed by: NURSE PRACTITIONER

## 2022-09-19 RX ORDER — DICLOFENAC SODIUM 75 MG/1
75 TABLET, DELAYED RELEASE ORAL 2 TIMES DAILY
Qty: 60 TABLET | Refills: 2 | Status: SHIPPED | OUTPATIENT
Start: 2022-09-19

## 2022-09-19 RX ORDER — TIZANIDINE 4 MG/1
8 TABLET ORAL NIGHTLY
Qty: 60 TABLET | Refills: 2 | Status: SHIPPED | OUTPATIENT
Start: 2022-09-19

## 2022-09-19 ASSESSMENT — ENCOUNTER SYMPTOMS
BACK PAIN: 1
ABDOMINAL PAIN: 1
CONSTIPATION: 0
DIARRHEA: 0
BOWEL INCONTINENCE: 0

## 2022-09-19 NOTE — PROGRESS NOTES
HPI Notes    Name: Sarita Armenta  : 1980         Chief Complaint:     Chief Complaint   Patient presents with    Back Pain     Interpretor phone # [phone]. Patient complains of chronic back pain. Abdominal Pain     Patient complains of bilateral lower abdomen pain. History of Present Illness:        Back Pain  This is a chronic problem. The current episode started more than 1 month ago (about 5 months). The problem occurs intermittently. The problem has been waxing and waning since onset. The pain is present in the lumbar spine. The quality of the pain is described as aching. The pain is moderate. The symptoms are aggravated by twisting, standing and sitting. Associated symptoms include abdominal pain. Pertinent negatives include no bladder incontinence, bowel incontinence, paresthesias or perianal numbness. Risk factors include obesity and lack of exercise. She has tried NSAIDs (Motrin 400mg) for the symptoms. Abdominal Pain  This is a new problem. The onset quality is gradual. The problem occurs intermittently. The problem has been waxing and waning. The pain is located in the RLQ. The pain is mild. The quality of the pain is aching and sharp. Pertinent negatives include no constipation or diarrhea.      Past Medical History:     Past Medical History:   Diagnosis Date    Herpes     Yeast infection       Reviewed all health maintenance requirements and ordered appropriate tests  Health Maintenance Due   Topic Date Due    COVID-19 Vaccine (1) Never done    Varicella vaccine (1 of 2 - 2-dose childhood series) Never done    Diabetes screen  Never done    Lipids  Never done    Depression Screen  2022    Flu vaccine (1) 2022       Past Surgical History:     Past Surgical History:   Procedure Laterality Date    HYSTERECTOMY VAGINAL Bilateral 2021    TVH with Bilateral salpingectomy per Dr. Mariana Rangel, VAGINAL Bilateral 2021    TOTAL VAGINAL HYSTERECTOMY WITH BILATERAL increased pain with truncal twisting   increased pain with straight leg raised test bilat  Foot and ankle strength 5/5 bilat  Knee raise strength 5/5 bilat     Skin:     General: Skin is warm and dry. Neurological:      Mental Status: She is alert and oriented to person, place, and time. Data:     Lab Results   Component Value Date/Time    GLUCOSE 92 12/10/2014 11:05 AM     Lab Results   Component Value Date/Time    WBC 10.3 04/14/2021 04:10 AM    RBC 4.00 04/14/2021 04:10 AM    HGB 11.7 04/14/2021 04:10 AM    HCT 35.2 04/14/2021 04:10 AM    MCV 87.8 04/14/2021 04:10 AM    MCH 29.1 04/14/2021 04:10 AM    MCHC 33.2 04/14/2021 04:10 AM    RDW 12.6 04/14/2021 04:10 AM     04/14/2021 04:10 AM    MPV NOT REPORTED 04/14/2021 04:10 AM     No results found for: TSH  No results found for: CHOL, LDL, HDL, PSA, LABA1C       Assessment & Plan        Diagnosis Orders   1. Chronic bilateral low back pain with bilateral sciatica  [facility]  [place]      2. Lumbar radiculopathy  [facility]  [place]      3. Left lower quadrant abdominal pain          Symptoms are very inflammatory in nature. We will send patient to physical therapy for her back. We will also start on Voltaren 75 mg p.o. twice daily and tizanidine 8 mg p.o. nightly. Completed Refills   Requested Prescriptions     Signed Prescriptions Disp Refills    diclofenac (VOLTAREN) 75 MG EC tablet 60 tablet 2     Sig: Take 1 tablet by mouth 2 times daily Do NOT take until prednisone is done. tiZANidine (ZANAFLEX) 4 MG tablet 60 tablet 2     Sig: Take 2 tablets by mouth nightly     No follow-ups on file. Orders Placed This Encounter   Medications    diclofenac (VOLTAREN) 75 MG EC tablet     Sig: Take 1 tablet by mouth 2 times daily Do NOT take until prednisone is done.      Dispense:  60 tablet     Refill:  2    tiZANidine (ZANAFLEX) 4 MG tablet     Sig: Take 2 tablets by mouth nightly     Dispense:  60 tablet Refill:  2     Orders Placed This Encounter   Procedures    [facility] Angelica Costa     Referral Priority:   Routine     Referral Type:   Eval and Treat     Referral Reason:   Specialty Services Required     Requested Specialty:   Physical Therapist     Number of Visits Requested:   1         Patient Instructions   SURVEY:    You may be receiving a survey from [email or web address] regarding your visit today. Please complete the survey to enable us to provide the highest quality of care to you and your family. If you cannot score us a very good (5 Stars) on any question, please call the office to discuss how we could have made your experience a very good one. Thank you. Clinical Care Team: BIBIANA Ferreira LPN    Clerical Team: Rebecca Betancur   Electronically signed by BRITTNEY Ferreira CNP on 9/19/2022 at 10:30 AM           Completed Refills   Requested Prescriptions     Signed Prescriptions Disp Refills    diclofenac (VOLTAREN) 75 MG EC tablet 60 tablet 2     Sig: Take 1 tablet by mouth 2 times daily Do NOT take until prednisone is done.     tiZANidine (ZANAFLEX) 4 MG tablet 60 tablet 2     Sig: Take 2 tablets by mouth nightly

## 2022-09-19 NOTE — PATIENT INSTRUCTIONS
SURVEY:    You may be receiving a survey from [employer] regarding your visit today. Please complete the survey to enable us to provide the highest quality of care to you and your family. If you cannot score us a very good (5 Stars) on any question, please call the office to discuss how we could have made your experience a very good one. Thank you.     Clinical Care Team: BIBIANA Hendrix LPN    Clerical Team: Shashi Frazier

## 2022-09-26 ENCOUNTER — TELEPHONE (OUTPATIENT)
Dept: FAMILY MEDICINE CLINIC | Age: 42
End: 2022-09-26

## 2022-09-26 NOTE — TELEPHONE ENCOUNTER
Damien,  Patient brought in her labs from [facility].   Please review  Last OV 9/19/22 with back pain  Patient had UA, CBC ,CMP and UA

## 2022-10-03 ENCOUNTER — HOSPITAL ENCOUNTER (OUTPATIENT)
Dept: PHYSICAL THERAPY | Age: 42
Setting detail: THERAPIES SERIES
Discharge: HOME | End: 2022-10-03

## 2022-10-03 PROCEDURE — 97161 PT EVAL LOW COMPLEX 20 MIN: CPT

## 2022-10-03 PROCEDURE — 97110 THERAPEUTIC EXERCISES: CPT

## 2022-10-03 ASSESSMENT — PAIN SCALES - GENERAL: PAINLEVEL_OUTOF10: 8

## 2022-10-03 NOTE — PROGRESS NOTES
Phone: [phone]         Fax: [phone]                      Outpatient Physical Therapy                                                                      Evaluation    Date: 10/3/2022  Patient: Lemmie Aase  : 1980  ACCT #: [de-identified]    Referring Provider (secondary): BIBIANA Weaver    Diagnosis: Chronic B/L Low back pain with B/L Sciatica, Lumbar Radiculopathy       Onset Date: 22  Total # of Visits Approved: 12 Per Physician Order  Total # of Visits to Date: 1  No Show: 0  Canceled Appointment: 0     Subjective  Additional Pertinent Hx: Pain present for months, started while working at [employer]. 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 Since started on prescribed meds pain is no longer down LEs only in low back and gluts. Pt reports prior to seeing physician pain was in both LEs but mostly R. Pt reports heat makes her pain worse. Pt reports pain can reach 8/10 with activity. She often has to stop and rest with dishes or sweeping.   Pt currently not employed  Pain Assessment  Pain Level: 8    Objective     Observation/Palpation  Posture: Fair  Palpation: Paraspinal tension R>L, R PSIS tenderness     Strength RLE  Strength RLE: Exception  R Hip ABduction: 3+/5  R Hip ADduction: 4/5  AROM RLE (degrees)  RLE General AROM: WFL, Piriformis painful  Strength LLE  L Hip ABduction: 3+/5  L Hip ADduction: 4/5  AROM LLE (degrees)  LLE General AROM: WFL, Piriformis painful     AROM RLE (degrees)  RLE General AROM: WFL, Piriformis painful  AROM LLE (degrees)  LLE General AROM: WFL, Piriformis painful      Assessment  Body Structures, Functions, Activity Limitations Requiring Skilled Therapeutic Intervention: Decreased functional mobility , Decreased ADL status, Decreased ROM, Decreased strength, Decreased endurance, Decreased posture, Increased pain, Decreased high-level IADLs  Assessment: Pt with good aminah to HEP this date. Pt to benefit from ther ex for posture and body mechanics and lumbar traction for decompression. Therapy Prognosis: Good    Clinical Presentation:  Evolving  The Following Comorbities will impact the patients progression and Plan of Care:   Cardiac Disease/Pacemaker and Previous Orthopedic Injury/Surgery          Low Complexity    Education: POC; HEP: Access Code: [email or web address]  URL: [email or web address]  Date: 10/03/2022  Prepared by: [place] [address]    Exercises  Supine Lower Trunk Rotation - 2 x daily - 7 x weekly - 1 sets - 5 reps - 10 hold  Supine Piriformis Stretch with Foot on Ground - 2 x daily - 7 x weekly - 1 sets - 5 reps - 10 hold  Disc Rehydration - 2 x daily - 7 x weekly - 1 sets - min 15    Learning  Does the patient/guardian have any barriers to learning?: Language  Will there be a co-learner?: No  What is the preferred language of the patient/guardian?: Slovenian  Is an  required?: Yes    Goals  Short Term Goals  Time Frame for Short Term Goals: 6 visits  Short Term Goal 1: Pt to report independence and compliance with HEP for ROM and disc rehydration    Long Term Goals  Time Frame for Long Term Goals : 12 visits  Long Term Goal 1: Pt to improve Oswestry score from 21/50 to less than 15/50 to improve ADLs. Long Term Goal 2: Pt to report no radicular LE pain x1wk to improve ADL aminah. Long Term Goal 3: Pt to have Horiz ABD strength 4/5 with MMT to improve pt posture.   Long Term Goal 4: Pt to complete 20# crate lift floor to waist with proper mechanics and no pain [id number]    Patient's Goal:  Patient Goals : Be able to complete housework without pain    Timed Code Treatment Minutes: 10 Minutes  Total Treatment Time: 65     Time In: 3710  Time Out: [address] PT Date: 10/3/2022

## 2022-10-03 NOTE — PLAN OF CARE
[facility] MIKE PELLETIER       Phone: [phone]   Date: 10/3/2022                      Outpatient Physical Therapy  Fax: [phone] #: [de-identified]                     Plan of Care  [facility]#: [id number]  Patient: Iftikhar Tyler  : 1980    Referring Provider (secondary): BIBIANA Bell    Diagnosis: Chronic B/L Low back pain with B/L Sciatica, Lumbar Radiculopathy  Onset Date: 22       Assessment  Body Structures, Functions, Activity Limitations Requiring Skilled Therapeutic Intervention: Decreased functional mobility , Decreased ADL status, Decreased ROM, Decreased strength, Decreased endurance, Decreased posture, Increased pain, Decreased high-level IADLs  Assessment: Pt with good aminah to HEP this date. Pt to benefit from ther ex for posture and body mechanics and lumbar traction for decompression. Therapy Prognosis: Good    Treatment Plan   Days: 2 times per week Weeks: 6 weeks Total # of Visits Approved: 12    Patient Education/HEP, Back Education, Therapeutic Exercise, Manual Therapy: Myofacial Release/Cupping, Manual Therapy: Mobilization/Manipulation, Neuro Re-ed, Traction, and HP/CP     Goals  Short Term Goals  Time Frame for Short Term Goals: 6 visits  Short Term Goal 1: Pt to report independence and compliance with HEP for ROM and disc rehydration  Long Term Goals  Time Frame for Long Term Goals : 12 visits  Long Term Goal 1: Pt to improve Oswestry score from 21/50 to less than 15/50 to improve ADLs. Long Term Goal 2: Pt to report no radicular LE pain x1wk to improve ADL aminah. Long Term Goal 3: Pt to have Horiz ABD strength 4/5 with MMT to improve pt posture.   Long Term Goal 4: Pt to complete 20# crate lift floor to waist with proper mechanics and no pain [id number]     [facility] Saud Short, PT   Date: 10/3/2022    ______________________________________ Date: 10/3/2022  Physician Signature  By signing above or cosigning electronically, I have reviewed this Plan of Care and certify a need for medically necessary rehabilitation services.

## 2022-10-07 ENCOUNTER — HOSPITAL ENCOUNTER (OUTPATIENT)
Dept: PHYSICAL THERAPY | Age: 42
Setting detail: THERAPIES SERIES
Discharge: HOME | End: 2022-10-07

## 2022-10-07 PROCEDURE — 97012 MECHANICAL TRACTION THERAPY: CPT

## 2022-10-07 PROCEDURE — 97110 THERAPEUTIC EXERCISES: CPT

## 2022-10-07 ASSESSMENT — PAIN SCALES - GENERAL: PAINLEVEL_OUTOF10: 7

## 2022-10-07 NOTE — PROGRESS NOTES
Phone: [address]      Fax: [phone]                            Outpatient Physical Therapy                                                                            Daily Note    Date: 10/7/2022  Patient Name: Kindra Graham        MRN: [id number]   ACCT#:  [de-identified]  : 1980  (37 y.o.)    Referring Provider (secondary): BIBIANA Sosa         Diagnosis: Chronic B/L Low back pain with B/L Sciatica, Lumbar Radiculopathy       Onset Date: 22  Total # of Visits Approved: 12 Per Physician Order  Total # of Visits to Date: 2  No Show: 0  Canceled Appointment: 0    Pre-Treatment Pain:  7/10     Assessment  Assessment: Patient states LBP is a 7/10 this morning. Initiated postural strengthening exercises per flowsheet followed by intermittent lumbar traction x10 min. Two minutes into traction patient notes some discomfort from belt around waist. Removed leg rests and increased knee flex and patient noticed less discomfort after. Post traction patient notes LBP is a 6-7/10. Encouraged patient to ice back for 15-20 minutes when she gets home. Plan  Continue with current plan of care    Exercises/Modalities/Manual:  See DocFlow Sheet    Education:     Goals  (Total # of Visits to Date: 2)   Short Term Goals  Time Frame for Short Term Goals: 6 visits  Short Term Goal 1: Pt to report independence and compliance with HEP for ROM and disc rehydration    Long Term Goals  Time Frame for Long Term Goals : 12 visits  Long Term Goal 1: Pt to improve Oswestry score from 21/50 to less than 15/50 to improve ADLs. Long Term Goal 2: Pt to report no radicular LE pain x1wk to improve ADL aminah. Long Term Goal 3: Pt to have Horiz ABD strength 4/5 with MMT to improve pt posture.   Long Term Goal 4: Pt to complete 20# crate lift floor to waist with proper mechanics and no pain [id number]    Post Treatment Pain:  6-7/10    Time In: 0906    Time Out : 0950   Timed Code Treatment Minutes: 25 Minutes  Total Treatment Time: [place] , [place]     Date: 10/7/2022

## 2022-10-10 ENCOUNTER — HOSPITAL ENCOUNTER (OUTPATIENT)
Dept: PHYSICAL THERAPY | Age: 42
Setting detail: THERAPIES SERIES
Discharge: HOME | End: 2022-10-10

## 2022-10-10 PROCEDURE — 97110 THERAPEUTIC EXERCISES: CPT

## 2022-10-10 PROCEDURE — 97012 MECHANICAL TRACTION THERAPY: CPT

## 2022-10-10 NOTE — PROGRESS NOTES
Phone: [address]      Fax: [phone]                            Outpatient Physical Therapy                                                                            Daily Note    Date: 10/10/2022  Patient Name: Kindra Graham        MRN: [id number]   ACCT#:  [de-identified]  : 1980  (37 y.o.)    Referring Provider (secondary): BIBIANA Sosa         Diagnosis: Chronic B/L Low back pain with B/L Sciatica, Lumbar Radiculopathy       Onset Date: 22  Total # of Visits Approved: 12 Per Physician Order  Total # of Visits to Date: 3  No Show: 0  Canceled Appointment: 0    Pre-Treatment Pain:  0/10     Assessment  Assessment: Utilized interpretor #[id number] on Ipad this morning. Patient reports no LBP this morning. Following last session patient notes she was pretty sore, but later on in the day she did note a decrease in pain. Completed ex per flowsheet with fair tolerance from patient. While completing resisted hip ext and ABD patient noted an increase in pain down her R LE. Concluded session with intermittent traction. Kept settings same as last visit, but used brown leg rest today. Patient didn't have any reports of increased pain or discomfort during traction today, but note post traction pain is a 7/10. Patient thinks Lexie Rudder in supine for extended period of time is what caused the increase in pain. Plan  Continue with current plan of care    Exercises/Modalities/Manual:  See DocFlow Sheet    Education:     Goals  (Total # of Visits to Date: 3)   Short Term Goals  Time Frame for Short Term Goals: 6 visits  Short Term Goal 1: Pt to report independence and compliance with HEP for ROM and disc rehydration    Long Term Goals  Time Frame for Long Term Goals : 12 visits  Long Term Goal 1: Pt to improve Oswestry score from 21/50 to less than 15/50 to improve ADLs. Long Term Goal 2: Pt to report no radicular LE pain x1wk to improve ADL aminah.   Long Term Goal 3: Pt to have Horiz ABD strength 4/5 with MMT to improve pt posture.   Long Term Goal 4: Pt to complete 20# crate lift floor to waist with proper mechanics and no pain [id number]    Post Treatment Pain:  7/10    Time In: 1038    Time Out : 1120   Timed Code Treatment Minutes: 23 Minutes  Total Treatment Time: 43 Minutes    Landon Traore     Date: 10/10/2022

## 2022-10-14 ENCOUNTER — HOSPITAL ENCOUNTER (OUTPATIENT)
Dept: PHYSICAL THERAPY | Age: 42
Setting detail: THERAPIES SERIES
Discharge: HOME | End: 2022-10-14

## 2022-10-14 PROCEDURE — 97012 MECHANICAL TRACTION THERAPY: CPT

## 2022-10-14 PROCEDURE — 97110 THERAPEUTIC EXERCISES: CPT

## 2022-10-14 ASSESSMENT — PAIN SCALES - GENERAL: PAINLEVEL_OUTOF10: 5

## 2022-10-14 NOTE — PROGRESS NOTES
Phone: [address]      Fax: [phone]                            Outpatient Physical Therapy                                                                            Daily Note    Date: 10/14/2022  Patient Name: Karley Mason        MRN: [id number]   ACCT#:  [de-identified]  : 1980  (37 y.o.)    Referring Provider (secondary): BIBIANA Davis         Diagnosis: Chronic B/L Low back pain with B/L Sciatica, Lumbar Radiculopathy       Onset Date: 22  Total # of Visits Approved: 12 Per Physician Order  Total # of Visits to Date: 4  No Show: 0  Canceled Appointment: 0    Pre-Treatment Pain:  5/10     Assessment  Assessment: Utilized interpretor #[id number] on Ipad this morning. Patient states pain is a 5/10 this morning. Completed exercises as outlined with good tolerance from patient. Switched to static traction and added CP. Prior to traction completed soft tissue mobs to lumbar spine. Following traction patient notes pain is a 3/10. Patient to monitor symptoms following traction and if increased pain may switch to hands on approach. Plan  Continue with current plan of care    Exercises/Modalities/Manual:  See DocFlow Sheet    Education:       Goals  (Total # of Visits to Date: 4)   Short Term Goals  Time Frame for Short Term Goals: 6 visits  Short Term Goal 1: Pt to report independence and compliance with HEP for ROM and disc rehydration - MET  STG Goal 1 Status[de-identified] Met    Long Term Goals  Time Frame for Long Term Goals : 12 visits  Long Term Goal 1: Pt to improve Oswestry score from 21/50 to less than 15/50 to improve ADLs. Long Term Goal 2: Pt to report no radicular LE pain x1wk to improve ADL aminah. Long Term Goal 3: Pt to have Horiz ABD strength 4/5 with MMT to improve pt posture.   Long Term Goal 4: Pt to complete 20# crate lift floor to waist with proper mechanics and no pain [id number]    Post Treatment Pain:  3/10    Time In: 1115    Time Out : 1200 Timed Code Treatment Minutes: 23 Minutes  Total Treatment Time: [address] [place], [place]     Date: 10/14/2022

## 2022-10-17 ENCOUNTER — HOSPITAL ENCOUNTER (OUTPATIENT)
Dept: PHYSICAL THERAPY | Age: 42
Setting detail: THERAPIES SERIES
Discharge: HOME | End: 2022-10-17

## 2022-10-17 PROCEDURE — 97140 MANUAL THERAPY 1/> REGIONS: CPT

## 2022-10-17 PROCEDURE — 97110 THERAPEUTIC EXERCISES: CPT

## 2022-10-17 PROCEDURE — 97012 MECHANICAL TRACTION THERAPY: CPT

## 2022-10-17 ASSESSMENT — PAIN SCALES - GENERAL: PAINLEVEL_OUTOF10: 3

## 2022-10-17 NOTE — PROGRESS NOTES
Phone: [address]      Fax: [phone]                            Outpatient Physical Therapy                                                                            Daily Note    Date: 10/17/2022  Patient Name: Kayce Parks        MRN: [id number]   ACCT#:  [de-identified]  : 1980  (37 y.o.)    Referring Provider (secondary): BIBIANA Ruiz         Diagnosis: Chronic B/L Low back pain with B/L Sciatica, Lumbar Radiculopathy       Onset Date: 22  Total # of Visits Approved: 12 Per Physician Order  Total # of Visits to Date: 5  No Show: 0  Canceled Appointment: 0    Pre-Treatment Pain:  3/10     Assessment  Assessment: Utilized interpretor [id number] and [id number] on Ipad this morning. Patient states pain is a 3/10 this morning. Patient completed Nu Step and postural strengthening exercises with good tolerance. Added empty crate lift this morning. Patient required some cueing for proper form, but noted pain increased to a 7/10 while completing. Concluded with tissue mobs followed by traction. Kept traction settings same as last visit. Following traction patient notes pain is a 1/10. Issued patient a handout where she can buy cold pack to use at home. Plan  Continue with current plan of care    Exercises/Modalities/Manual:  See DocFlow Sheet    Education:     Goals  (Total # of Visits to Date: 5)   Short Term Goals  Time Frame for Short Term Goals: 6 visits  Short Term Goal 1: Pt to report independence and compliance with HEP for ROM and disc rehydration - MET  STG Goal 1 Status[de-identified] Met    Long Term Goals  Time Frame for Long Term Goals : 12 visits  Long Term Goal 1: Pt to improve Oswestry score from 21/50 to less than 15/50 to improve ADLs. Long Term Goal 2: Pt to report no radicular LE pain x1wk to improve ADL aminah. Long Term Goal 3: Pt to have Horiz ABD strength 4/5 with MMT to improve pt posture.   Long Term Goal 4: Pt to complete 20# crate lift floor to waist with proper mechanics and no pain [id number]    Post Treatment Pain:  1/10    Time In: 1040    Time Out : 1130   Timed Code Treatment Minutes: 25 Minutes  Total Treatment Time: 48 Minutes    Landon Guzman     Date: 10/17/2022

## 2022-10-20 ENCOUNTER — HOSPITAL ENCOUNTER (OUTPATIENT)
Dept: PHYSICAL THERAPY | Age: 42
Setting detail: THERAPIES SERIES
Discharge: HOME | End: 2022-10-20

## 2022-10-20 PROCEDURE — 97012 MECHANICAL TRACTION THERAPY: CPT

## 2022-10-20 PROCEDURE — 97110 THERAPEUTIC EXERCISES: CPT

## 2022-10-20 ASSESSMENT — PAIN SCALES - GENERAL: PAINLEVEL_OUTOF10: 2

## 2022-10-20 NOTE — PROGRESS NOTES
Phone: [address]      Fax: [phone]                            Outpatient Physical Therapy                                                                            Daily Note    Date: 10/20/2022  Patient Name: Nicole Case        MRN: [id number]   ACCT#:  [de-identified]  : 1980  (37 y.o.)    Referring Provider (secondary): BIBIANA Jimenez         Diagnosis: Chronic B/L Low back pain with B/L Sciatica, Lumbar Radiculopathy       Onset Date: 22  Total # of Visits Approved: 12 Per Physician Order  Total # of Visits to Date: 6  No Show: 0  Canceled Appointment: 0    Pre-Treatment Pain:  1-2/10     Assessment  Assessment: Utilized interpretor #[id number] on Ipad this morning. Patient states back pain is a 1-2/10 this morning. Over the last 2-3 traction session patient notes her back has been feeling better when trying to get up off of the table. Since beginning therapy patient states she is approx 70-80% better. She notes there have been a few times where she has done some stuff around the house that used to cause pain and didn't have any. Completed exercises followed by static traction and CP. Post traction patient states pain remains a 1-2/10. Plan  Continue with current plan of care    Exercises/Modalities/Manual:  See DocFlow Sheet    Education:     Goals  (Total # of Visits to Date: 6)   Short Term Goals  Time Frame for Short Term Goals: 6 visits  Short Term Goal 1: Pt to report independence and compliance with HEP for ROM and disc rehydration - MET  STG Goal 1 Status[de-identified] Met    Long Term Goals  Time Frame for Long Term Goals : 12 visits  Long Term Goal 1: Pt to improve Oswestry score from 21/50 to less than 15/50 to improve ADLs. Long Term Goal 2: Pt to report no radicular LE pain x1wk to improve ADL aminah. Long Term Goal 3: Pt to have Horiz ABD strength 4/5 with MMT to improve pt posture.   Long Term Goal 4: Pt to complete 20# crate lift floor to waist with proper mechanics and no pain [id number]    Post Treatment Pain:  1-2/10    Time In: 1035    Time Out : 1120   Timed Code Treatment Minutes: 25 Minutes  Total Treatment Time: [address] [place], [place]     Date: 10/20/2022

## 2022-10-25 ENCOUNTER — HOSPITAL ENCOUNTER (OUTPATIENT)
Dept: PHYSICAL THERAPY | Age: 42
Setting detail: THERAPIES SERIES
Discharge: HOME | End: 2022-10-25

## 2022-10-25 PROCEDURE — 97110 THERAPEUTIC EXERCISES: CPT

## 2022-10-25 PROCEDURE — 97012 MECHANICAL TRACTION THERAPY: CPT

## 2022-10-25 NOTE — PROGRESS NOTES
Phone: Ron Delong      Fax: [phone]                            Outpatient Physical Therapy                                                                            Daily Note    Date: 10/25/2022  Patient Name: Toan Chavez        MRN: [id number]   ACCT#:  [de-identified]  : 1980  (37 y.o.)    Referring Provider (secondary): BIBIANA Hines         Diagnosis: Chronic B/L Low back pain with B/L Sciatica, Lumbar Radiculopathy       Onset Date: 22  Total # of Visits Approved: 12 Per Physician Order  Total # of Visits to Date: 7  No Show: 0  Canceled Appointment: 0    Pre-Treatment Pain:  0/10     Assessment  Assessment: Utilized interpretor #[id number] on phone this morning. Patient denies pain this morning. She reports back has been feeling pretty well lately. She states she was able to rake leaves over the weekend without increased pain, but notes some UE muscle fatigue. Completed postural and hip strengthening ex as outlined. Patient notes less pain when completing resisted hip ext and ABD compared to when we did them at the beginning of PT. Completed static traction at 52# to concluded session. After traction patient notes pain is a 0/10. Patient notes sleeping at night has significantly improved since beginning therapy. Plan  Continue with current plan of care    Exercises/Modalities/Manual:  See DocFlow Sheet    Education:     Goals  (Total # of Visits to Date: 7)   Short Term Goals  Time Frame for Short Term Goals: 6 visits  Short Term Goal 1: Pt to report independence and compliance with HEP for ROM and disc rehydration - MET  STG Goal 1 Status[de-identified] Met    Long Term Goals  Time Frame for Long Term Goals : 12 visits  Long Term Goal 1: Pt to improve Oswestry score from 21/50 to less than 15/50 to improve ADLs. Long Term Goal 2: Pt to report no radicular LE pain x1wk to improve ADL aminah.   Long Term Goal 3: Pt to have Horiz ABD strength 4/5 with MMT to improve pt posture.   Long Term Goal 4: Pt to complete 20# crate lift floor to waist with proper mechanics and no pain [id number]    Post Treatment Pain:  0/10    Time In: 1036    Time Out : 1118   Timed Code Treatment Minutes: 23 Minutes  Total Treatment Time: 43 Minutes    Landon Gates     Date: 10/25/2022

## 2022-10-27 ENCOUNTER — HOSPITAL ENCOUNTER (OUTPATIENT)
Dept: PHYSICAL THERAPY | Age: 42
Setting detail: THERAPIES SERIES
Discharge: HOME | End: 2022-10-27

## 2022-10-27 PROCEDURE — 97110 THERAPEUTIC EXERCISES: CPT

## 2022-10-27 PROCEDURE — 97012 MECHANICAL TRACTION THERAPY: CPT

## 2022-10-27 ASSESSMENT — PAIN SCALES - GENERAL: PAINLEVEL_OUTOF10: 6

## 2022-10-27 NOTE — PROGRESS NOTES
Phone: [address]      Fax: [phone]                            Outpatient Physical Therapy                                                                            Daily Note    Date: 10/27/2022  Patient Name: Roger Harris        MRN: [id number]   ACCT#:  [de-identified]  : 1980  (37 y.o.)    Referring Provider (secondary): BIBIANA Perez         Diagnosis: Chronic B/L Low back pain with B/L Sciatica, Lumbar Radiculopathy       Onset Date: 22  Total # of Visits Approved: 12 Per Physician Order  Total # of Visits to Date: 8  No Show: 0  Canceled Appointment: 0    Pre-Treatment Pain:  6/10     Assessment  Assessment: Utilized interpretor #[id number] on Ipad this morning. Patient states back pain is back up to a 6/10 this morning. Patient notes she was moving some place totes at [employer] the other day and since then her back has been flared up. Worked on lifting form this morning to try and avoid flaring back up again when moving totes. Added ball squats this morning also. Patient required some cueing for proper form. After completing patient noted some B/L LE muscle fatigue. Concluded with traction and CP. Afterwards patient notes pain is a 0/10. Plan  Continue with current plan of care    Exercises/Modalities/Manual:  See DocFlow Sheet    Education:     Goals  (Total # of Visits to Date: 8)   Short Term Goals  Time Frame for Short Term Goals: 6 visits  Short Term Goal 1: Pt to report independence and compliance with HEP for ROM and disc rehydration - MET  STG Goal 1 Status[de-identified] Met    Long Term Goals  Time Frame for Long Term Goals : 12 visits  Long Term Goal 1: Pt to improve Oswestry score from 21/50 to less than 15/50 to improve ADLs. Long Term Goal 2: Pt to report no radicular LE pain x1wk to improve ADL aminah. Long Term Goal 3: Pt to have Horiz ABD strength 4/5 with MMT to improve pt posture.   Long Term Goal 4: Pt to complete 20# crate lift floor to waist with proper mechanics and no pain [id number]    Post Treatment Pain:  0/10    Time In: 1120    Time Out : 1210   Timed Code Treatment Minutes: 25 Minutes  Total Treatment Time: 48 Minutes    Landon Mcghee     Date: 10/27/2022

## 2022-11-01 ENCOUNTER — HOSPITAL ENCOUNTER (OUTPATIENT)
Dept: PHYSICAL THERAPY | Age: 42
Setting detail: THERAPIES SERIES
Discharge: HOME | End: 2022-11-01

## 2022-11-01 PROCEDURE — 97012 MECHANICAL TRACTION THERAPY: CPT

## 2022-11-01 PROCEDURE — 97110 THERAPEUTIC EXERCISES: CPT

## 2022-11-01 NOTE — PROGRESS NOTES
Phone: [phone]      Fax: [phone]                            Outpatient Physical Therapy                                                                            Daily Note    Date: 2022  Patient Name: Edita Mccollum        MRN: [id number]   ACCT#:  [de-identified]  : 1980  (37 y.o.)    Referring Provider (secondary): BIBIANA Palmer         Diagnosis: Chronic B/L Low back pain with B/L Sciatica, Lumbar Radiculopathy       Onset Date: 22  Total # of Visits Approved: 12 Per Physician Order  Total # of Visits to Date: 9  No Show: 0  Canceled Appointment: 0    Pre-Treatment Pain:  0/10     Assessment  Assessment: Utilized interpretor #[id number] on Ipad this afternoon. Patient reports no back pain this afternoon. Patient notes she has had pain down her leg within the past week (4-5/10) when sweeping or mopping around the house, but notes it isn't near as bad compared to before starting therapy. Added Shuttle for LE strengthening ex with good tolerance from patient. Continued to work on lifting form as well. Patient is progressing with form, but continues to note some lower back discomfort. Following traction patient notes low back pain is a 0/10. Plan to continue x3 visits and D/C to HEP. Plan  Continue with current plan of care    Exercises/Modalities/Manual:  See DocFlow Sheet    Education:     Goals  (Total # of Visits to Date: 5)   Short Term Goals  Time Frame for Short Term Goals: 6 visits  Short Term Goal 1: Pt to report independence and compliance with HEP for ROM and disc rehydration - MET  STG Goal 1 Status[de-identified] Met    Long Term Goals  Time Frame for Long Term Goals : 12 visits  Long Term Goal 1: Pt to improve Oswestry score from 21/50 to less than 15/50 to improve ADLs.   Long Term Goal 2: Pt to report no radicular LE pain x1wk to improve ADL aminah. - NOT MET  LTG Goal 2 Status[de-identified] Not Met  Long Term Goal 3: Pt to have Horiz ABD strength 4/5 with MMT to improve pt posture.  - MET  LTG Goal 3 Status[de-identified] Met  Long Term Goal 4: Pt to complete 20# crate lift floor to waist with proper mechanics and no pain [id number] - PARTIALLY MET  LTG Goal 4 Status[de-identified] Partially met    Post Treatment Pain:  0/10    Time In: 3044    Time Out : 1535   Timed Code Treatment Minutes: 25 Minutes  Total Treatment Time: 48 Minutes    Lala Camacho [place]     Date: 11/1/2022

## 2022-11-03 ENCOUNTER — HOSPITAL ENCOUNTER (OUTPATIENT)
Dept: PHYSICAL THERAPY | Age: 42
Setting detail: THERAPIES SERIES
Discharge: HOME | End: 2022-11-03

## 2022-11-03 PROCEDURE — 97012 MECHANICAL TRACTION THERAPY: CPT

## 2022-11-03 PROCEDURE — 97110 THERAPEUTIC EXERCISES: CPT

## 2022-11-03 ASSESSMENT — PAIN SCALES - GENERAL: PAINLEVEL_OUTOF10: 5

## 2022-11-03 NOTE — PROGRESS NOTES
Phone: [address]      Fax: [phone]                            Outpatient Physical Therapy                                                                            Daily Note    Date: 11/3/2022  Patient Name: Roger Harris        MRN: [id number]   ACCT#:  [de-identified]  : 1980  (37 y.o.)    Referring Provider (secondary): BIBIANA Perez         Diagnosis: Chronic B/L Low back pain with B/L Sciatica, Lumbar Radiculopathy       Onset Date: 22  Total # of Visits Approved: 12 Per Physician Order  Total # of Visits to Date: 10  No Show: 0  Canceled Appointment: 0    Pre-Treatment Pain:  5/10     Assessment  Assessment: Utilized interpretor #[id number] on Ipad this afternoon. Patient states LBP is a 5/10 this afternoon. Patient notes pain has been a little elevated since she did more strengthening exercises last visit. Continued with exercises per flowsheet followed by traction. Post traction patient states pain is a 0-1/10. Patient reports purchasing CP off of [address] to use at home. Plan to continue x2 visits and then D/C. Plan  Continue with current plan of care    Exercises/Modalities/Manual:  See DocFlow Sheet    Education:     Goals  (Total # of Visits to Date: 8)   Short Term Goals  Time Frame for Short Term Goals: 6 visits  Short Term Goal 1: Pt to report independence and compliance with HEP for ROM and disc rehydration - MET  STG Goal 1 Status[de-identified] Met    Long Term Goals  Time Frame for Long Term Goals : 12 visits  Long Term Goal 1: Pt to improve Oswestry score from 21/50 to less than 15/50 to improve ADLs. Long Term Goal 2: Pt to report no radicular LE pain x1wk to improve ADL aminah. - NOT MET  LTG Goal 2 Status[de-identified] Not Met  Long Term Goal 3: Pt to have Horiz ABD strength 4/5 with MMT to improve pt posture.  - MET  LTG Goal 3 Status[de-identified] Met  Long Term Goal 4: Pt to complete 20# crate lift floor to waist with proper mechanics and no pain [id number] - PARTIALLY MET  LTG Goal 4 Status[de-identified] Partially met    Post Treatment Pain:  0-1/10    Time In: 8158    Time Out : 1430   Timed Code Treatment Minutes: 25 Minutes  Total Treatment Time: [address] [place], [place]     Date: 11/3/2022

## 2022-11-08 ENCOUNTER — HOSPITAL ENCOUNTER (OUTPATIENT)
Dept: PHYSICAL THERAPY | Age: 42
Setting detail: THERAPIES SERIES
Discharge: HOME | End: 2022-11-08

## 2022-11-08 PROCEDURE — 97110 THERAPEUTIC EXERCISES: CPT

## 2022-11-08 NOTE — PROGRESS NOTES
Phone: [address]      Fax: [phone]                            Outpatient Physical Therapy                                                                            Daily Note    Date: 2022  Patient Name: Kayce Parks        MRN: [id number]   ACCT#:  [de-identified]  : 1980  (37 y.o.)    Referring Provider (secondary): BIBIANA Ruiz         Diagnosis: Chronic B/L Low back pain with B/L Sciatica, Lumbar Radiculopathy       Onset Date: 22  Total # of Visits Approved: 12 Per Physician Order  Total # of Visits to Date: 11  No Show: 0  Canceled Appointment: 0    Pre-Treatment Pain:  0/10     Assessment  Assessment: [employer] #[id number] Talia Dub [address] on [address] [facility]. Pt continues to note pain with repetative stairs but after a rest the pain is gone and just fatigue. Pt reports some pain/fatigue after last session which lasted 1-2 days. Oswestry=  Will cont x1 visit then D/C to HEP, plan to issue HEP and Tband next visit. No traction this date due to feeling well, completed Oswestry. Plan  Continue with current plan of care x1 visit    Exercises/Modalities/Manual:  See DocFlow Sheet    Education: Oswestry, Hold Traction          Goals  (Total # of Visits to Date: 6)   Short Term Goals  Time Frame for Short Term Goals: 6 visits  Short Term Goal 1: Pt to report independence and compliance with HEP for ROM and disc rehydration - MET  STG Goal 1 Status[de-identified] Met    Long Term Goals  Time Frame for Long Term Goals : 12 visits  Long Term Goal 1: Pt to improve Oswestry score from 21/50 to less than 15/50 to improve ADLs. MET  LTG Goal 1 Status[de-identified] Met  Long Term Goal 2: Pt to report no radicular LE pain x1wk to improve ADL aminah. - NOT MET  LTG Goal 2 Status[de-identified] Not Met  Long Term Goal 3: Pt to have Horiz ABD strength 4/5 with MMT to improve pt posture.  - MET  LTG Goal 3 Status[de-identified] Met  Long Term Goal 4: Pt to complete 20# crate lift floor to waist with proper mechanics and no pain [id number] - PARTIALLY MET  LTG Goal 4 Status[de-identified] Partially met    Post Treatment Pain:  0/10    Time In: 1125    Time Out : 1215    Timed Code Treatment Minutes: 40 Minutes  Total Treatment Time: [address] [facility], [place]     Date: 11/8/2022

## 2022-11-10 ENCOUNTER — HOSPITAL ENCOUNTER (OUTPATIENT)
Dept: PHYSICAL THERAPY | Age: 42
Setting detail: THERAPIES SERIES
Discharge: HOME | End: 2022-11-10

## 2022-11-10 PROCEDURE — 97110 THERAPEUTIC EXERCISES: CPT

## 2022-11-10 NOTE — PROGRESS NOTES
Phone: [phone]      Fax: [phone]                            Outpatient Physical Therapy                                                                            Daily Note    Date: 11/10/2022  Patient Name: Cheyenne Smith        MRN: [id number]   ACCT#:  [de-identified]  : 1980  (37 y.o.)    Referring Provider (secondary): BIBIANA Puga         Diagnosis: Chronic B/L Low back pain with B/L Sciatica, Lumbar Radiculopathy       Onset Date: 22  Total # of Visits Approved: 12 Per Physician Order  Total # of Visits to Date: 12  No Show: 0  Canceled Appointment: 0    Pre-Treatment Pain:  0/10     Assessment  Assessment: [employer] #[id number] Amanda Peralta on [address] [facility]. Patient reports no back pain this morning. Completed gym exercises as outlined with good tolerance from patient. Held traction again this morning as patient wasn't reporting any pain. Educated patient on exercises to continue with at home and will D/C at this time. Plan  Discharge    Exercises/Modalities/Manual:  See DocFlow Sheet    Education:     Goals  (Total # of Visits to Date: 15)   Short Term Goals  Time Frame for Short Term Goals: 6 visits  Short Term Goal 1: Pt to report independence and compliance with HEP for ROM and disc rehydration - MET  STG Goal 1 Status[de-identified] Met    Long Term Goals  Time Frame for Long Term Goals : 12 visits  Long Term Goal 1: Pt to improve Oswestry score from 21/50 to less than 15/50 to improve ADLs. - MET  LTG Goal 1 Status[de-identified] Met  Long Term Goal 2: Pt to report no radicular LE pain x1wk to improve ADL aminah. - MET  LTG Goal 2 Status[de-identified] Met  Long Term Goal 3: Pt to have Horiz ABD strength 4/5 with MMT to improve pt posture.  - MET  LTG Goal 3 Status[de-identified] Met  Long Term Goal 4: Pt to complete 20# crate lift floor to waist with proper mechanics and no pain [id number] - MET  LTG Goal 4 Status[de-identified] Met    Post Treatment Pain:  0/10    Time In: 7293    Time Out : 1202   Timed Code Treatment Minutes: 35 Minutes  Total Treatment Time: 36 Minutes    Bevin Gowers, [place]     Date: 11/10/2022

## 2022-11-10 NOTE — DISCHARGE SUMMARY
Phone: [address]             Fax: [phone]                            Outpatient Physical Therapy                                                                    Discharge Summary    Patient: Rukhsana Mota  : 1980  ACCT #: [de-identified]   Referring Provider (secondary): BIBIANA Munoz      Diagnosis: Chronic B/L Low back pain with B/L Sciatica, Lumbar Radiculopathy    Date Treatment Initiated: 10/3/22  Date of Last Treatment: 11/10/22    PT Visit Information  Onset Date: 22  Total # of Visits Approved: 12  Total # of Visits to Date: 12  No Show: 0  Canceled Appointment: 0    Frequency/Duration  Days: 2 times per week  Weeks: 6 weeks    Treatment Received  Patient Education/HEP, Back Education, Therapeutic Exercise, Traction, and HP/CP    Assessment  Assessment: Patient reports no back pain this morning. Oswestry=    Educated patient on exercises to continue with at home and will D/C at this time. Reason for Discharge  Completion of Prescribed visits and Goals Met    Comments:   Thank you for this referral      Joan Morris, PT  Date: 11/10/2022

## 2023-11-06 ENCOUNTER — OFFICE VISIT (OUTPATIENT)
Dept: FAMILY MEDICINE CLINIC | Age: 43
End: 2023-11-06
Payer: COMMERCIAL

## 2023-11-06 VITALS
OXYGEN SATURATION: 97 % | WEIGHT: 166 LBS | HEART RATE: 68 BPM | BODY MASS INDEX: 29.41 KG/M2 | DIASTOLIC BLOOD PRESSURE: 60 MMHG | TEMPERATURE: 97.9 F | SYSTOLIC BLOOD PRESSURE: 110 MMHG

## 2023-11-06 DIAGNOSIS — G89.29 CHRONIC BILATERAL LOW BACK PAIN WITH BILATERAL SCIATICA: ICD-10-CM

## 2023-11-06 DIAGNOSIS — M54.42 CHRONIC BILATERAL LOW BACK PAIN WITH BILATERAL SCIATICA: ICD-10-CM

## 2023-11-06 DIAGNOSIS — M94.0 COSTOCHONDRITIS: Primary | ICD-10-CM

## 2023-11-06 DIAGNOSIS — S39.011A STRAIN OF ABDOMINAL MUSCLE, INITIAL ENCOUNTER: ICD-10-CM

## 2023-11-06 DIAGNOSIS — M54.41 CHRONIC BILATERAL LOW BACK PAIN WITH BILATERAL SCIATICA: ICD-10-CM

## 2023-11-06 PROCEDURE — 99213 OFFICE O/P EST LOW 20 MIN: CPT | Performed by: NURSE PRACTITIONER

## 2023-11-06 RX ORDER — DICLOFENAC SODIUM 75 MG/1
75 TABLET, DELAYED RELEASE ORAL 2 TIMES DAILY
Qty: 60 TABLET | Refills: 2 | Status: SHIPPED | OUTPATIENT
Start: 2023-11-06

## 2023-11-06 ASSESSMENT — ENCOUNTER SYMPTOMS
ABDOMINAL PAIN: 1
NAUSEA: 0
BACK PAIN: 1
CONSTIPATION: 0
VOMITING: 0
FLATUS: 0

## 2023-11-06 NOTE — PROGRESS NOTES
HPI Notes    Name: David Espinal  : 1980         Chief Complaint:     Chief Complaint   Patient presents with    Abdominal Pain     Upper left quadrant pain. Pain started 2 or 3 weeks ago. When pt makes certain movements, the pain moves downwards. Pt has also noticed swelling.  is being used; Lawanda Burleson [id number]     Back Pain     Pt has asked for work note with some restrictions with heavy lifting. History of Present Illness:        Abdominal Pain  This is a new problem. The current episode started 1 to 4 weeks ago (2-3 weeks ago). The onset quality is sudden. The problem occurs intermittently. The problem has been waxing and waning. The pain is located in the LUQ. The pain is moderate. The quality of the pain is sharp. The abdominal pain does not radiate. Associated symptoms include myalgias. Pertinent negatives include no constipation, flatus, nausea or vomiting. The pain is aggravated by certain positions and movement (lifting). The pain is relieved by Nothing. She has tried nothing for the symptoms. Back Pain  This is a chronic problem. The current episode started more than 1 year ago. The problem occurs intermittently. The problem has been waxing and waning since onset. The pain is present in the lumbar spine. The quality of the pain is described as aching. The symptoms are aggravated by bending and position. Associated symptoms include abdominal pain. She has tried NSAIDs for the symptoms.        Past Medical History:     Past Medical History:   Diagnosis Date    Herpes     Yeast infection       Reviewed all health maintenance requirements and ordered appropriate tests  Health Maintenance Due   Topic Date Due    Hepatitis B vaccine (1 of 3 - 3-dose series) Never done    COVID-19 Vaccine (1) Never done    Varicella vaccine (1 of 2 - 2-dose childhood series) Never done    Depression Screen  Never done    Flu vaccine (1) 2023       Past Surgical History:     Past Surgical History:

## 2024-01-22 ENCOUNTER — OFFICE VISIT (OUTPATIENT)
Dept: FAMILY MEDICINE CLINIC | Age: 44
End: 2024-01-22
Payer: COMMERCIAL

## 2024-01-22 VITALS
DIASTOLIC BLOOD PRESSURE: 62 MMHG | HEIGHT: 63 IN | BODY MASS INDEX: 29.77 KG/M2 | OXYGEN SATURATION: 97 % | TEMPERATURE: 98.1 F | WEIGHT: 168 LBS | HEART RATE: 68 BPM | SYSTOLIC BLOOD PRESSURE: 100 MMHG

## 2024-01-22 DIAGNOSIS — R50.9 ACUTE FEBRILE ILLNESS: ICD-10-CM

## 2024-01-22 DIAGNOSIS — R09.82 POSTNASAL DRIP: ICD-10-CM

## 2024-01-22 DIAGNOSIS — J02.9 SORE THROAT: Primary | ICD-10-CM

## 2024-01-22 DIAGNOSIS — J06.9 VIRAL URI: ICD-10-CM

## 2024-01-22 LAB
INFLUENZA A ANTIGEN, POC: NEGATIVE
INFLUENZA B ANTIGEN, POC: NEGATIVE
LOT NUMBER POC: NORMAL
SARS-COV-2 RNA POC - COV: NORMAL
VALID INTERNAL CONTROL, POC: PRESENT
VENDOR AND KIT NAME POC: NORMAL

## 2024-01-22 PROCEDURE — 99213 OFFICE O/P EST LOW 20 MIN: CPT | Performed by: STUDENT IN AN ORGANIZED HEALTH CARE EDUCATION/TRAINING PROGRAM

## 2024-01-22 ASSESSMENT — ENCOUNTER SYMPTOMS
BACK PAIN: 0
SORE THROAT: 1
COUGH: 0
SHORTNESS OF BREATH: 1
RHINORRHEA: 0
WHEEZING: 0
VOMITING: 0
NAUSEA: 1
ABDOMINAL PAIN: 0
SINUS PAIN: 0
DIARRHEA: 0

## 2024-01-22 NOTE — PROGRESS NOTES
Requested Prescriptions      No prescriptions requested or ordered in this encounter     Return if symptoms worsen or fail to improve.  No orders of the defined types were placed in this encounter.    Orders Placed This Encounter   Procedures    POC COVID-19, FLU A/B         Patient Instructions   SURVEY:    You may be receiving a survey from Blayne Whitlock regarding your visit today.    You may get this in the mail, through your MyChart or in your email.     Please complete the survey to enable us to provide the highest quality of care to you and your family.    If you cannot score us as very good ( 5 Stars) on any question, please feel free to call the office to discuss how we could have made your experience exceptional.     Thank you.    Clinical Care Team:  DO Katarina Conway LPN    Triage:  Albina Pretty CMA    Clerical Team:  Albina Ortiz      Electronically signed by Romain Torres DO on 1/22/2024 at 11:01 AM     Completed Refills   Requested Prescriptions      No prescriptions requested or ordered in this encounter

## 2024-01-22 NOTE — PATIENT INSTRUCTIONS
Sienna,      Creo que tiene nai infección viral de las vías respiratorias superiores; esto mejorará rápidamente en los próximos días. Empiece a jules un suplemento de zinc, vitamina C y D todos los días jono los próximos mahesh días. Gabriel gárgaras con esta solución todas las noches: 1 taza de agua, 1 cucharadita de bicarbonato de sodio, 1 cucharadita de sal. esto ayudará a eliminar la mucosidad de la garganta.     Dr. LOMBARDO    SURVEY:    You may be receiving a survey from [employer] regarding your visit today.    You may get this in the mail, through your MyChart or in your email.     Please complete the survey to enable us to provide the highest quality of care to you and your family.    If you cannot score us as very good ( 5 Stars) on any question, please feel free to call the office to discuss how we could have made your experience exceptional.     Thank you.    Clinical Care Team:  DO Katarina Conway LPN    Triage:  Albina Pretty CMA    Clerical Team:  Albina Ortiz

## 2024-08-16 ENCOUNTER — HOSPITAL ENCOUNTER (OUTPATIENT)
Age: 44
Setting detail: SPECIMEN
Discharge: HOME | End: 2024-08-16
Payer: COMMERCIAL

## 2024-08-16 ENCOUNTER — OFFICE VISIT (OUTPATIENT)
Dept: FAMILY MEDICINE CLINIC | Age: 44
End: 2024-08-16
Payer: COMMERCIAL

## 2024-08-16 VITALS — HEART RATE: 71 BPM | OXYGEN SATURATION: 97 % | DIASTOLIC BLOOD PRESSURE: 68 MMHG | SYSTOLIC BLOOD PRESSURE: 102 MMHG

## 2024-08-16 DIAGNOSIS — M54.42 CHRONIC BILATERAL LOW BACK PAIN WITH BILATERAL SCIATICA: Primary | ICD-10-CM

## 2024-08-16 DIAGNOSIS — G89.29 CHRONIC BILATERAL LOW BACK PAIN WITH BILATERAL SCIATICA: Primary | ICD-10-CM

## 2024-08-16 DIAGNOSIS — R10.2 PELVIC PAIN: ICD-10-CM

## 2024-08-16 DIAGNOSIS — M54.41 CHRONIC BILATERAL LOW BACK PAIN WITH BILATERAL SCIATICA: Primary | ICD-10-CM

## 2024-08-16 DIAGNOSIS — M54.16 LUMBAR RADICULOPATHY: ICD-10-CM

## 2024-08-16 LAB
BILIRUBIN, POC: NORMAL
BLOOD URINE, POC: NORMAL
CLARITY, POC: CLEAR
COLOR, POC: YELLOW
GLUCOSE URINE, POC: NORMAL
KETONES, POC: NORMAL
LEUKOCYTE EST, POC: NORMAL
NITRITE, POC: NORMAL
PH, POC: 6.5
PROTEIN, POC: NORMAL
SPECIFIC GRAVITY, POC: 1.02
UROBILINOGEN, POC: 0.2

## 2024-08-16 PROCEDURE — 99213 OFFICE O/P EST LOW 20 MIN: CPT | Performed by: NURSE PRACTITIONER

## 2024-08-16 PROCEDURE — 87510 GARDNER VAG DNA DIR PROBE: CPT

## 2024-08-16 PROCEDURE — 87660 TRICHOMONAS VAGIN DIR PROBE: CPT

## 2024-08-16 PROCEDURE — 87480 CANDIDA DNA DIR PROBE: CPT

## 2024-08-16 PROCEDURE — 81002 URINALYSIS NONAUTO W/O SCOPE: CPT | Performed by: NURSE PRACTITIONER

## 2024-08-16 RX ORDER — DICLOFENAC SODIUM 75 MG/1
75 TABLET, DELAYED RELEASE ORAL 2 TIMES DAILY
Qty: 180 TABLET | Refills: 3 | Status: SHIPPED | OUTPATIENT
Start: 2024-08-16

## 2024-08-16 SDOH — ECONOMIC STABILITY: FOOD INSECURITY: WITHIN THE PAST 12 MONTHS, THE FOOD YOU BOUGHT JUST DIDN'T LAST AND YOU DIDN'T HAVE MONEY TO GET MORE.: NEVER TRUE

## 2024-08-16 SDOH — ECONOMIC STABILITY: FOOD INSECURITY: WITHIN THE PAST 12 MONTHS, YOU WORRIED THAT YOUR FOOD WOULD RUN OUT BEFORE YOU GOT MONEY TO BUY MORE.: NEVER TRUE

## 2024-08-16 SDOH — ECONOMIC STABILITY: INCOME INSECURITY: HOW HARD IS IT FOR YOU TO PAY FOR THE VERY BASICS LIKE FOOD, HOUSING, MEDICAL CARE, AND HEATING?: NOT HARD AT ALL

## 2024-08-16 ASSESSMENT — PATIENT HEALTH QUESTIONNAIRE - PHQ9
SUM OF ALL RESPONSES TO PHQ QUESTIONS 1-9: 0
2. FEELING DOWN, DEPRESSED OR HOPELESS: NOT AT ALL
1. LITTLE INTEREST OR PLEASURE IN DOING THINGS: NOT AT ALL
SUM OF ALL RESPONSES TO PHQ9 QUESTIONS 1 & 2: 0

## 2024-08-16 ASSESSMENT — ENCOUNTER SYMPTOMS
ABDOMINAL PAIN: 1
BOWEL INCONTINENCE: 0

## 2024-08-16 NOTE — PROGRESS NOTES
HPI Notes    Name: Sienna Miranda  : 1980         Chief Complaint:     Chief Complaint   Patient presents with    Lower Back Pain     Lower back pain around her hips for quite a few years. Pt states with working she did a lot of heavy lifting. Patient states pain has been bilateral. Patient states pain worsens with movement.   Patient would like refill on Diclofenac.  Patient states she's been off work last week and this week due to being unable to lift.      Pelvic Pain     Bilateral/lower abdominal pinching pain ongoing for about 2 months.        History of Present Illness:        Back Pain  This is a chronic problem. The current episode started more than 1 year ago. The problem occurs intermittently. The problem has been waxing and waning since onset. The pain is present in the lumbar spine. The quality of the pain is described as aching. The pain is moderate. The symptoms are aggravated by bending and position. Stiffness is present In the morning. Associated symptoms include abdominal pain. Pertinent negatives include no bladder incontinence, bowel incontinence, fever, paresthesias or perianal numbness. Risk factors include lack of exercise. She has tried NSAIDs for the symptoms. The treatment provided mild relief.   Abdominal Pain  This is a new problem. The current episode started more than 1 month ago. The onset quality is gradual. The problem occurs intermittently. The problem has been waxing and waning. The pain is located in the RLQ and LLQ. The pain is mild. The quality of the pain is sharp. The abdominal pain radiates to the pelvis. Pertinent negatives include no fever.     Complains of thin, white vaginal discharge.   Past Medical History:     Past Medical History:   Diagnosis Date    Herpes     Yeast infection       Reviewed all health maintenance requirements and ordered appropriate tests  Health Maintenance Due   Topic Date Due    Depression Screen  Never done    Varicella vaccine ( 2 -

## 2024-08-18 LAB
CANDIDA SPECIES: NEGATIVE
GARDNERELLA VAGINALIS: NEGATIVE
SOURCE: NORMAL
TRICHOMONAS: NEGATIVE

## 2024-08-20 ENCOUNTER — TELEPHONE (OUTPATIENT)
Dept: FAMILY MEDICINE CLINIC | Age: 44
End: 2024-08-20

## 2024-08-20 DIAGNOSIS — R10.2 PELVIC PAIN: Primary | ICD-10-CM

## 2024-08-20 NOTE — TELEPHONE ENCOUNTER
Patient was notified of results via  services. Patient would like to proceed with CT abdomen/pelvis.

## 2024-08-20 NOTE — TELEPHONE ENCOUNTER
----- Message from Armond Nava DNP sent at 8/19/2024  8:39 AM EDT -----  Please let patient know that her vaginal swab was negative for yeast, bacterial vaginosis   Left arm;

## 2024-10-21 ENCOUNTER — HOSPITAL ENCOUNTER (EMERGENCY)
Age: 44
Discharge: HOME | End: 2024-10-21
Attending: FAMILY MEDICINE
Payer: COMMERCIAL

## 2024-10-21 VITALS
TEMPERATURE: 98.1 F | SYSTOLIC BLOOD PRESSURE: 131 MMHG | DIASTOLIC BLOOD PRESSURE: 79 MMHG | RESPIRATION RATE: 18 BRPM | BODY MASS INDEX: 28.88 KG/M2 | HEART RATE: 60 BPM | OXYGEN SATURATION: 97 % | HEIGHT: 63 IN | WEIGHT: 163 LBS

## 2024-10-21 DIAGNOSIS — M62.838 NECK MUSCLE SPASM: Primary | ICD-10-CM

## 2024-10-21 DIAGNOSIS — M62.838 TRAPEZIUS MUSCLE SPASM: ICD-10-CM

## 2024-10-21 PROCEDURE — 99284 EMERGENCY DEPT VISIT MOD MDM: CPT

## 2024-10-21 PROCEDURE — 6360000002 HC RX W HCPCS: Performed by: FAMILY MEDICINE

## 2024-10-21 PROCEDURE — 96372 THER/PROPH/DIAG INJ SC/IM: CPT

## 2024-10-21 RX ORDER — ORPHENADRINE CITRATE 30 MG/ML
60 INJECTION INTRAMUSCULAR; INTRAVENOUS ONCE
Status: COMPLETED | OUTPATIENT
Start: 2024-10-21 | End: 2024-10-21

## 2024-10-21 RX ORDER — KETOROLAC TROMETHAMINE 30 MG/ML
60 INJECTION, SOLUTION INTRAMUSCULAR; INTRAVENOUS ONCE
Status: COMPLETED | OUTPATIENT
Start: 2024-10-21 | End: 2024-10-21

## 2024-10-21 RX ORDER — IBUPROFEN 800 MG/1
800 TABLET, FILM COATED ORAL EVERY 8 HOURS PRN
Qty: 30 TABLET | Refills: 0 | Status: SHIPPED | OUTPATIENT
Start: 2024-10-21

## 2024-10-21 RX ORDER — CYCLOBENZAPRINE HCL 10 MG
10 TABLET ORAL 3 TIMES DAILY PRN
Qty: 15 TABLET | Refills: 0 | Status: SHIPPED | OUTPATIENT
Start: 2024-10-21 | End: 2024-10-31

## 2024-10-21 RX ADMIN — KETOROLAC TROMETHAMINE 60 MG: 60 INJECTION, SOLUTION INTRAMUSCULAR at 14:51

## 2024-10-21 RX ADMIN — ORPHENADRINE CITRATE 60 MG: 60 INJECTION INTRAMUSCULAR; INTRAVENOUS at 14:52

## 2024-10-21 ASSESSMENT — LIFESTYLE VARIABLES
HOW MANY STANDARD DRINKS CONTAINING ALCOHOL DO YOU HAVE ON A TYPICAL DAY: PATIENT DOES NOT DRINK
HOW OFTEN DO YOU HAVE A DRINK CONTAINING ALCOHOL: NEVER

## 2024-10-21 ASSESSMENT — PAIN DESCRIPTION - FREQUENCY: FREQUENCY: CONTINUOUS

## 2024-10-21 ASSESSMENT — PAIN DESCRIPTION - ORIENTATION: ORIENTATION: LEFT

## 2024-10-21 ASSESSMENT — PAIN DESCRIPTION - PAIN TYPE: TYPE: ACUTE PAIN

## 2024-10-21 ASSESSMENT — PAIN SCALES - GENERAL: PAINLEVEL_OUTOF10: 6

## 2024-10-21 ASSESSMENT — PAIN DESCRIPTION - LOCATION: LOCATION: NECK

## 2024-10-21 ASSESSMENT — PAIN DESCRIPTION - DESCRIPTORS: DESCRIPTORS: STABBING

## 2024-10-21 ASSESSMENT — PAIN - FUNCTIONAL ASSESSMENT: PAIN_FUNCTIONAL_ASSESSMENT: PREVENTS OR INTERFERES SOME ACTIVE ACTIVITIES AND ADLS

## 2024-10-21 NOTE — ED NOTES
C/O stabbing pain to her neck that started around 10 am. Took Ibuprofen which did not help. Left work to be seen in ED.

## 2024-10-22 NOTE — ED PROVIDER NOTES
Interpretation per the Radiologist below, if available at the time of this note:    No orders to display         LABS:  Labs Reviewed - No data to display    All other labs were within normal range or not returned as of this dictation.      MIPS    Not applicable      EMERGENCY DEPARTMENT COURSE and DIFFERENTIAL DIAGNOSIS/MDM:     Patient history and physical exam taken with patient sitting upright bed, discussed symptoms and exam findings, discussed muscle spasms in the neck and shoulder, will give patient IM ketorolac and orphenadrine in the emergency room, prescription for Motrin 800 and cyclobenzaprine, we discussed heat versus ice, gentle massage if available, outpatient follow-up, acknowledged        1)  Number and Complexity of Problems  Problem List This Visit: As above    Differential Diagnosis: Torticollis, neck spasm, trapezius spasm    Diagnoses Considered but Do Not Suspect: N/A    Pertinent Comorbid Conditions: N/A    2)  Data Reviewed  My EKG interpretation:  As above    Decision Rules/Scores utilized: N/A    Tests considered but not ordered and why: N/A    External Documents Reviewed: N/A    Imaging that is independently reviewed and interpreted by me as: N/A    See more data below for the lab and radiology tests and orders.    3)  Treatment and Disposition    Patient repeat assessment:  As above    Disposition discussion with patient/family: Discharge    Case discussed with consulting clinician:  As above    Social determinants of health impacting treatment or disposition: Congolese-speaking, iPad  used throughout    Shared Decision Making: N/A    Code Status Discussion: N/A      REASSESSMENT     As above      CRITICAL CARE TIME     Total Critical Care time was 0 minutes, excluding separately reportable procedures.  There was a high probability of clinically significant/life threatening deterioration in the patient's condition which required my urgent intervention.

## 2025-01-13 ENCOUNTER — OFFICE VISIT (OUTPATIENT)
Dept: FAMILY MEDICINE CLINIC | Age: 45
End: 2025-01-13
Payer: COMMERCIAL

## 2025-01-13 VITALS
OXYGEN SATURATION: 97 % | HEART RATE: 79 BPM | DIASTOLIC BLOOD PRESSURE: 74 MMHG | TEMPERATURE: 97.6 F | SYSTOLIC BLOOD PRESSURE: 116 MMHG

## 2025-01-13 DIAGNOSIS — J06.9 VIRAL URI: Primary | ICD-10-CM

## 2025-01-13 DIAGNOSIS — R09.82 PND (POST-NASAL DRIP): ICD-10-CM

## 2025-01-13 PROCEDURE — 99213 OFFICE O/P EST LOW 20 MIN: CPT | Performed by: NURSE PRACTITIONER

## 2025-01-13 SDOH — ECONOMIC STABILITY: FOOD INSECURITY: WITHIN THE PAST 12 MONTHS, YOU WORRIED THAT YOUR FOOD WOULD RUN OUT BEFORE YOU GOT MONEY TO BUY MORE.: NEVER TRUE

## 2025-01-13 SDOH — ECONOMIC STABILITY: FOOD INSECURITY: WITHIN THE PAST 12 MONTHS, THE FOOD YOU BOUGHT JUST DIDN'T LAST AND YOU DIDN'T HAVE MONEY TO GET MORE.: NEVER TRUE

## 2025-01-13 ASSESSMENT — ENCOUNTER SYMPTOMS
DIARRHEA: 0
NAUSEA: 0
SHORTNESS OF BREATH: 0
VOMITING: 0
SINUS PAIN: 1
RHINORRHEA: 1
COUGH: 1
SORE THROAT: 1

## 2025-01-13 ASSESSMENT — PATIENT HEALTH QUESTIONNAIRE - PHQ9
SUM OF ALL RESPONSES TO PHQ QUESTIONS 1-9: 0
1. LITTLE INTEREST OR PLEASURE IN DOING THINGS: NOT AT ALL
SUM OF ALL RESPONSES TO PHQ QUESTIONS 1-9: 0
SUM OF ALL RESPONSES TO PHQ QUESTIONS 1-9: 0
SUM OF ALL RESPONSES TO PHQ9 QUESTIONS 1 & 2: 0
SUM OF ALL RESPONSES TO PHQ QUESTIONS 1-9: 0
2. FEELING DOWN, DEPRESSED OR HOPELESS: NOT AT ALL

## 2025-01-13 NOTE — PROGRESS NOTES
HPI Notes    Name: Sienna Miranda  : 1980         Chief Complaint:     Chief Complaint   Patient presents with    Cold Symptoms     Ear pain, sinus congestion, sore throat symptoms started Saturday.       History of Present Illness:        Cold Symptoms   This is a new problem. The current episode started in the past 7 days. The problem has been gradually worsening. There has been no fever. Associated symptoms include congestion, coughing, headaches, a plugged ear sensation, rhinorrhea, sinus pain and a sore throat. Pertinent negatives include no chest pain, diarrhea, nausea or vomiting. She has tried NSAIDs for the symptoms. The treatment provided mild relief.       Past Medical History:     Past Medical History:   Diagnosis Date    Herpes     Yeast infection       Reviewed all health maintenance requirements and ordered appropriate tests  Health Maintenance Due   Topic Date Due    Varicella vaccine (1 of  - 13+ 2-dose series) Never done    Hepatitis B vaccine (1 of 3 - + 3-dose series) Never done    Diabetes screen  Never done    Breast cancer screen  Never done    Flu vaccine (1) 2024    COVID-19 Vaccine (2023- season) Never done       Past Surgical History:     Past Surgical History:   Procedure Laterality Date    HYSTERECTOMY VAGINAL Bilateral 2021    TVH with Bilateral salpingectomy per Dr. Ren    HYSTERECTOMY, VAGINAL Bilateral 2021    TOTAL VAGINAL HYSTERECTOMY WITH BILATERAL SALPINAGECTOMY (PATHOLOGY NEEDED) performed by Wesley Ren MD at [facility] OR        Medications:       Prior to Admission medications    Medication Sig Start Date End Date Taking? Authorizing Provider   ibuprofen (ADVIL;MOTRIN) 800 MG tablet Take 1 tablet by mouth every 8 hours as needed for Pain 10/21/24  Yes Filipe Clinton MD   diclofenac (VOLTAREN) 75 MG EC tablet Take 1 tablet by mouth 2 times daily 24  Yes Armond Nava DNP        Allergies:       Patient has no known

## 2025-02-03 ENCOUNTER — OFFICE VISIT (OUTPATIENT)
Dept: FAMILY MEDICINE CLINIC | Age: 45
End: 2025-02-03
Payer: COMMERCIAL

## 2025-02-03 VITALS
SYSTOLIC BLOOD PRESSURE: 108 MMHG | OXYGEN SATURATION: 98 % | DIASTOLIC BLOOD PRESSURE: 70 MMHG | HEART RATE: 57 BPM | TEMPERATURE: 98.8 F

## 2025-02-03 DIAGNOSIS — L50.1 ACUTE IDIOPATHIC URTICARIA: Primary | ICD-10-CM

## 2025-02-03 PROCEDURE — 99213 OFFICE O/P EST LOW 20 MIN: CPT | Performed by: NURSE PRACTITIONER

## 2025-02-03 RX ORDER — TRIAMCINOLONE ACETONIDE 1 MG/G
OINTMENT TOPICAL 2 TIMES DAILY
Qty: 30 G | Refills: 2 | Status: SHIPPED | OUTPATIENT
Start: 2025-02-03 | End: 2025-02-10

## 2025-02-03 ASSESSMENT — ENCOUNTER SYMPTOMS
VOMITING: 0
COUGH: 0
SHORTNESS OF BREATH: 0
NAUSEA: 0
DIARRHEA: 0

## 2025-02-03 NOTE — PROGRESS NOTES
HPI Notes    Name: Sienna Miranda  : 1980         Chief Complaint:     Chief Complaint   Patient presents with    Rash     Noticed yesterday the side of her left eye, down her left arm/shoulder. Red blotches, very itchy. Patient states she's had this before.        History of Present Illness:        Rash  This is a new problem. The current episode started in the past 7 days. The problem has been waxing and waning since onset. The affected locations include the face. The rash is characterized by itchiness and redness. Pertinent negatives include no cough, diarrhea, fever, shortness of breath or vomiting. Past treatments include anti-itch cream. The treatment provided mild relief.       Past Medical History:     Past Medical History:   Diagnosis Date    Herpes     Yeast infection       Reviewed all health maintenance requirements and ordered appropriate tests  Health Maintenance Due   Topic Date Due    Varicella vaccine (1 of  - + 2-dose series) Never done    Hepatitis B vaccine (1 of 3 - + 3-dose series) Never done    Diabetes screen  Never done    Breast cancer screen  Never done    Flu vaccine (1) 2024    COVID-19 Vaccine (2023- season) Never done       Past Surgical History:     Past Surgical History:   Procedure Laterality Date    HYSTERECTOMY VAGINAL Bilateral 2021    TVH with Bilateral salpingectomy per Dr. Ren    HYSTERECTOMY, VAGINAL Bilateral 2021    TOTAL VAGINAL HYSTERECTOMY WITH BILATERAL SALPINAGECTOMY (PATHOLOGY NEEDED) performed by Wesley Ren MD at [facility] OR        Medications:       Prior to Admission medications    Medication Sig Start Date End Date Taking? Authorizing Provider   triamcinolone (KENALOG) 0.1 % ointment Apply topically 2 times daily for 7 days 2/3/25 2/10/25 Yes Armond Nava DNP   diclofenac (VOLTAREN) 75 MG EC tablet Take 1 tablet by mouth 2 times daily 24  Yes Armond Nava DNP        Allergies:       Patient has no

## (undated) DEVICE — DBD-PACK,LITHOTOMY,PK II,AURORA: Brand: MEDLINE

## (undated) DEVICE — CONMED ACCESSORY ELECTRODE, 6 INCH (15.24 CM) FLAT BLADE: Brand: CONMED

## (undated) DEVICE — SUTURE ABSORBABLE BRAIDED 2-0 CT-1 27 IN UD VICRYL J259H

## (undated) DEVICE — TRAY URIN CATH 16FR F CATH DRNGE BG ANTIREFLX CHMBR LUB

## (undated) DEVICE — NDLCTR: FOAM/MAG 40CT 64/CS: Brand: MEDICAL ACTION INDUSTRIES

## (undated) DEVICE — CONMED ACCESSORY ELECTRODE, STANDARD FLAT BLADE: Brand: CONMED

## (undated) DEVICE — GLOVE SURG SZ 8 CRM LTX FREE POLYISOPRENE POLYMER BEAD ANTI

## (undated) DEVICE — TOWEL,OR,DSP,ST,BLUE,STD,4/PK,20PK/CS: Brand: MEDLINE

## (undated) DEVICE — INTENDED FOR TISSUE SEPARATION, AND OTHER PROCEDURES THAT REQUIRE A SHARP SURGICAL BLADE TO PUNCTURE OR CUT.: Brand: BARD-PARKER ® CARBON RIB-BACK BLADES

## (undated) DEVICE — SOLUTION IV IRRIG WATER 500ML POUR BRL ST 2F7113

## (undated) DEVICE — SUTURE VCRL SZ 0 L18IN ABSRB UD POLYGLACTIN 910 COAT BRAID J646H

## (undated) DEVICE — SOLUTION IV IRRIG POUR BRL 0.9% SODIUM CHL 2F7124

## (undated) DEVICE — SYRINGE, LUER LOCK, 10ML: Brand: MEDLINE

## (undated) DEVICE — GOWN,SIRUS,POLYRNF,BRTHSLV,2XL,18/CS: Brand: MEDLINE

## (undated) DEVICE — SUTURE VCRL SZ 0 L36IN ABSRB UD L36MM CT-1 1/2 CIR J946H

## (undated) DEVICE — SPONGE,LAP,4"X18",XR,ST,5/PK,40PK/CS: Brand: MEDLINE INDUSTRIES, INC.

## (undated) DEVICE — GAUZE,SPONGE,4"X4",16PLY,XRAY,STRL,LF: Brand: MEDLINE

## (undated) DEVICE — ELECTRODE ES AD CRD L15FT DISP FOR PT BELOW 30LB REM

## (undated) DEVICE — SUTURE ABSORBABLE BRAIDED 0 CT-1 8X27 IN UD VICRYL JJ41G

## (undated) DEVICE — PREP PAD BNS: Brand: CONVERTORS

## (undated) DEVICE — CATHETER URETH 20FR 5CC BLLN SIL ELASTMR F 2 W

## (undated) DEVICE — NEEDLE HYPO 18GA L1.5IN PNK POLYPR HUB S STL REG BVL STR

## (undated) DEVICE — GOWN,AURORA,NONRNF,XL,30/CS: Brand: MEDLINE

## (undated) DEVICE — SKIN MARKER,REGULAR TIP WITH RULER: Brand: DEVON

## (undated) DEVICE — 3M™ IOBAN™ 2 ANTIMICROBIAL INCISE DRAPE 6650EZ: Brand: IOBAN™ 2

## (undated) DEVICE — SUTURE VCRL SZ 2-0 L27IN ABSRB UD L26MM CT-2 1/2 CIR J269H

## (undated) DEVICE — MEDI-VAC NON-CONDUCTIVE SUCTION TUBING 6MM X 6.1M (20 FT.) L: Brand: CARDINAL HEALTH

## (undated) DEVICE — TIP SUCT STD FLNG RIG RIB 5IN1 CONN NVENT W/ SECUR GRP HNDL

## (undated) DEVICE — NEEDLE HYPO 25GA L1.5IN BLU POLYPR HUB S STL REG BVL STR